# Patient Record
Sex: MALE | Race: WHITE | NOT HISPANIC OR LATINO | URBAN - METROPOLITAN AREA
[De-identification: names, ages, dates, MRNs, and addresses within clinical notes are randomized per-mention and may not be internally consistent; named-entity substitution may affect disease eponyms.]

---

## 2017-01-31 ENCOUNTER — EMERGENCY (EMERGENCY)
Facility: HOSPITAL | Age: 82
LOS: 1 days | Discharge: HOME | End: 2017-01-31
Attending: EMERGENCY MEDICINE | Admitting: EMERGENCY MEDICINE
Payer: MEDICARE

## 2017-01-31 VITALS
WEIGHT: 134.92 LBS | HEART RATE: 76 BPM | OXYGEN SATURATION: 99 % | HEIGHT: 64 IN | DIASTOLIC BLOOD PRESSURE: 86 MMHG | RESPIRATION RATE: 18 BRPM | SYSTOLIC BLOOD PRESSURE: 146 MMHG | TEMPERATURE: 98 F

## 2017-01-31 DIAGNOSIS — Z98.890 OTHER SPECIFIED POSTPROCEDURAL STATES: ICD-10-CM

## 2017-01-31 DIAGNOSIS — Z88.8 ALLERGY STATUS TO OTHER DRUGS, MEDICAMENTS AND BIOLOGICAL SUBSTANCES STATUS: ICD-10-CM

## 2017-01-31 DIAGNOSIS — M67.432 GANGLION, LEFT WRIST: ICD-10-CM

## 2017-01-31 DIAGNOSIS — M79.642 PAIN IN LEFT HAND: ICD-10-CM

## 2017-01-31 DIAGNOSIS — Z95.0 PRESENCE OF CARDIAC PACEMAKER: Chronic | ICD-10-CM

## 2017-01-31 DIAGNOSIS — Z82.49 FAMILY HISTORY OF ISCHEMIC HEART DISEASE AND OTHER DISEASES OF THE CIRCULATORY SYSTEM: Chronic | ICD-10-CM

## 2017-01-31 DIAGNOSIS — Z98.890 OTHER SPECIFIED POSTPROCEDURAL STATES: Chronic | ICD-10-CM

## 2017-01-31 DIAGNOSIS — I25.10 ATHEROSCLEROTIC HEART DISEASE OF NATIVE CORONARY ARTERY WITHOUT ANGINA PECTORIS: ICD-10-CM

## 2017-01-31 PROCEDURE — 73130 X-RAY EXAM OF HAND: CPT | Mod: 26,LT

## 2017-01-31 PROCEDURE — 99283 EMERGENCY DEPT VISIT LOW MDM: CPT | Mod: 25

## 2017-01-31 NOTE — ED ADULT TRIAGE NOTE - CHIEF COMPLAINT QUOTE
Patient to Ed with complaint of left hand pain beginning 2 days PTA.  Patient states no trauma but bump has gotten larger.  No acute distress

## 2017-01-31 NOTE — ED PROVIDER NOTE - PSH
Family history of coronary artery bypass graft surgery    H/O prior ablation treatment    History of pacemaker

## 2017-01-31 NOTE — ED PROVIDER NOTE - MUSCULOSKELETAL, MLM
Spine appears normal, range of motion is not limited, + small focal swelling over carpal bones at dorsal L hand.

## 2017-01-31 NOTE — ED PROVIDER NOTE - OBJECTIVE STATEMENT
Pt here with swelling to dorsal aspect of the L hand a this week- atraumatic. + slightly painful. Worse with mvt. Not taking pain meds for it.

## 2017-01-31 NOTE — ED PROVIDER NOTE - MEDICAL DECISION MAKING DETAILS
hand pain and swelling most consistent with small ganglion cyst. Xrays wnl. will d/c with rec for hand fu. OTC NSAIDS and APAP for pain.

## 2017-07-17 ENCOUNTER — EMERGENCY (EMERGENCY)
Facility: HOSPITAL | Age: 82
LOS: 1 days | Discharge: HOME | End: 2017-07-17
Admitting: EMERGENCY MEDICINE
Payer: MEDICARE

## 2017-07-17 VITALS
OXYGEN SATURATION: 98 % | RESPIRATION RATE: 16 BRPM | TEMPERATURE: 99 F | DIASTOLIC BLOOD PRESSURE: 77 MMHG | SYSTOLIC BLOOD PRESSURE: 145 MMHG | HEART RATE: 76 BPM

## 2017-07-17 DIAGNOSIS — S30.861A INSECT BITE (NONVENOMOUS) OF ABDOMINAL WALL, INITIAL ENCOUNTER: ICD-10-CM

## 2017-07-17 DIAGNOSIS — Z98.890 OTHER SPECIFIED POSTPROCEDURAL STATES: Chronic | ICD-10-CM

## 2017-07-17 DIAGNOSIS — I25.10 ATHEROSCLEROTIC HEART DISEASE OF NATIVE CORONARY ARTERY WITHOUT ANGINA PECTORIS: ICD-10-CM

## 2017-07-17 DIAGNOSIS — Z95.0 PRESENCE OF CARDIAC PACEMAKER: Chronic | ICD-10-CM

## 2017-07-17 DIAGNOSIS — Z88.8 ALLERGY STATUS TO OTHER DRUGS, MEDICAMENTS AND BIOLOGICAL SUBSTANCES STATUS: ICD-10-CM

## 2017-07-17 DIAGNOSIS — Z82.49 FAMILY HISTORY OF ISCHEMIC HEART DISEASE AND OTHER DISEASES OF THE CIRCULATORY SYSTEM: Chronic | ICD-10-CM

## 2017-07-17 PROCEDURE — 99282 EMERGENCY DEPT VISIT SF MDM: CPT

## 2017-07-17 NOTE — ED PROVIDER NOTE - CARE PLAN
Principal Discharge DX:	Insect bite, initial encounter  Instructions for follow-up, activity and diet:	Please use hydrocortisone cream on area. Ice pack. Try not to scratch.   You may take antihistamine like claritin during the day for itching. Be cautious with benadryl as night as this could make you dizzy. follow up for any signs of infection

## 2017-07-17 NOTE — ED PROVIDER NOTE - MEDICAL DECISION MAKING DETAILS
83 year old male with cardiac h/o presents with bug bites to navel area. no chest pain, VSS. no infection. no abscess. will treat symptoms. follow up for worsening symptoms

## 2017-07-17 NOTE — ED PROVIDER NOTE - PMH
Coronary artery disease involving native heart, angina presence unspecified, unspecified vessel or lesion type    Pacemaker

## 2017-07-17 NOTE — ED PROVIDER NOTE - PLAN OF CARE
Please use hydrocortisone cream on area. Ice pack. Try not to scratch.   You may take antihistamine like claritin during the day for itching. Be cautious with benadryl as night as this could make you dizzy. follow up for any signs of infection

## 2017-07-17 NOTE — ED PROVIDER NOTE - SKIN, MLM
several insect bites surrounding navel, no sign of infection, mild erythema, no warmth, no drainage, no abscess

## 2017-07-17 NOTE — ED PROVIDER NOTE - OBJECTIVE STATEMENT
83 year old male presents with h/o CAD, triple bypass (2015), pacemaker placement with complaints of itchy bug bites around his navel. reports noticed this morning. reports putting lotion on it and witch hazel which helped some. comes today to ensure nothing more serious is going on.   denies fever, chills, N/V/D, chest pain, dyspnea, abdominal pain.

## 2017-07-17 NOTE — ED PROVIDER NOTE - SKIN [+], MLM
insect bites to area around navel, mild erythema, no warmth, no streaking, no fluctuance, no abscess

## 2017-09-29 ENCOUNTER — EMERGENCY (EMERGENCY)
Facility: HOSPITAL | Age: 82
LOS: 1 days | Discharge: HOME | End: 2017-09-29
Attending: EMERGENCY MEDICINE | Admitting: EMERGENCY MEDICINE
Payer: MEDICARE

## 2017-09-29 VITALS
RESPIRATION RATE: 18 BRPM | SYSTOLIC BLOOD PRESSURE: 134 MMHG | HEART RATE: 91 BPM | DIASTOLIC BLOOD PRESSURE: 79 MMHG | TEMPERATURE: 98 F | OXYGEN SATURATION: 99 %

## 2017-09-29 DIAGNOSIS — R42 DIZZINESS AND GIDDINESS: ICD-10-CM

## 2017-09-29 DIAGNOSIS — Z82.49 FAMILY HISTORY OF ISCHEMIC HEART DISEASE AND OTHER DISEASES OF THE CIRCULATORY SYSTEM: Chronic | ICD-10-CM

## 2017-09-29 DIAGNOSIS — Z95.0 PRESENCE OF CARDIAC PACEMAKER: Chronic | ICD-10-CM

## 2017-09-29 DIAGNOSIS — Z98.890 OTHER SPECIFIED POSTPROCEDURAL STATES: Chronic | ICD-10-CM

## 2017-09-29 DIAGNOSIS — Z88.2 ALLERGY STATUS TO SULFONAMIDES: ICD-10-CM

## 2017-09-29 DIAGNOSIS — R55 SYNCOPE AND COLLAPSE: ICD-10-CM

## 2017-09-29 LAB
ALBUMIN SERPL ELPH-MCNC: 4 G/DL — SIGNIFICANT CHANGE UP (ref 3.4–5)
ALP SERPL-CCNC: 73 U/L — SIGNIFICANT CHANGE UP (ref 40–120)
ALT FLD-CCNC: 18 U/L — SIGNIFICANT CHANGE UP (ref 12–42)
ANION GAP SERPL CALC-SCNC: 5 MMOL/L — LOW (ref 9–16)
APTT BLD: 46.3 SEC — HIGH (ref 27.5–36.5)
AST SERPL-CCNC: 25 U/L — SIGNIFICANT CHANGE UP (ref 15–37)
BASOPHILS NFR BLD AUTO: 0.8 % — SIGNIFICANT CHANGE UP (ref 0–2)
BILIRUB SERPL-MCNC: 1.3 MG/DL — HIGH (ref 0.2–1.2)
BUN SERPL-MCNC: 21 MG/DL — SIGNIFICANT CHANGE UP (ref 7–23)
CALCIUM SERPL-MCNC: 8.9 MG/DL — SIGNIFICANT CHANGE UP (ref 8.5–10.5)
CHLORIDE SERPL-SCNC: 100 MMOL/L — SIGNIFICANT CHANGE UP (ref 96–108)
CK SERPL-CCNC: 172 U/L — SIGNIFICANT CHANGE UP (ref 39–308)
CO2 SERPL-SCNC: 29 MMOL/L — SIGNIFICANT CHANGE UP (ref 22–31)
CREAT SERPL-MCNC: 1.32 MG/DL — HIGH (ref 0.5–1.3)
EOSINOPHIL NFR BLD AUTO: 0.5 % — SIGNIFICANT CHANGE UP (ref 0–6)
GLUCOSE SERPL-MCNC: 106 MG/DL — HIGH (ref 70–99)
HCT VFR BLD CALC: 41.6 % — SIGNIFICANT CHANGE UP (ref 39–50)
HGB BLD-MCNC: 12.7 G/DL — LOW (ref 13–17)
IMM GRANULOCYTES NFR BLD AUTO: 0.2 % — SIGNIFICANT CHANGE UP (ref 0–1.5)
INR BLD: 1.62 — HIGH (ref 0.88–1.16)
LYMPHOCYTES # BLD AUTO: 16.9 % — SIGNIFICANT CHANGE UP (ref 13–44)
MCHC RBC-ENTMCNC: 23.4 PG — LOW (ref 27–34)
MCHC RBC-ENTMCNC: 30.5 G/DL — LOW (ref 32–36)
MCV RBC AUTO: 76.6 FL — LOW (ref 80–100)
MONOCYTES NFR BLD AUTO: 9.4 % — SIGNIFICANT CHANGE UP (ref 2–14)
NEUTROPHILS NFR BLD AUTO: 72.2 % — SIGNIFICANT CHANGE UP (ref 43–77)
PLATELET # BLD AUTO: 117 K/UL — LOW (ref 150–400)
POTASSIUM SERPL-MCNC: 4.1 MMOL/L — SIGNIFICANT CHANGE UP (ref 3.5–5.3)
POTASSIUM SERPL-SCNC: 4.1 MMOL/L — SIGNIFICANT CHANGE UP (ref 3.5–5.3)
PROT SERPL-MCNC: 7.7 G/DL — SIGNIFICANT CHANGE UP (ref 6.4–8.2)
PROTHROM AB SERPL-ACNC: 18 SEC — HIGH (ref 9.8–12.7)
RBC # BLD: 5.43 M/UL — SIGNIFICANT CHANGE UP (ref 4.2–5.8)
RBC # FLD: 18.9 % — HIGH (ref 10.3–16.9)
SODIUM SERPL-SCNC: 134 MMOL/L — SIGNIFICANT CHANGE UP (ref 132–145)
TROPONIN I SERPL-MCNC: <0.017 NG/ML — LOW (ref 0.02–0.06)
WBC # BLD: 6.1 K/UL — SIGNIFICANT CHANGE UP (ref 3.8–10.5)
WBC # FLD AUTO: 6.1 K/UL — SIGNIFICANT CHANGE UP (ref 3.8–10.5)

## 2017-09-29 PROCEDURE — 93010 ELECTROCARDIOGRAM REPORT: CPT

## 2017-09-29 PROCEDURE — 93010 ELECTROCARDIOGRAM REPORT: CPT | Mod: 77

## 2017-09-29 PROCEDURE — 71020: CPT | Mod: 26

## 2017-09-29 PROCEDURE — 71010: CPT | Mod: 26

## 2017-09-29 PROCEDURE — 99285 EMERGENCY DEPT VISIT HI MDM: CPT | Mod: 25

## 2017-09-29 RX ORDER — SODIUM CHLORIDE 9 MG/ML
3 INJECTION INTRAMUSCULAR; INTRAVENOUS; SUBCUTANEOUS ONCE
Qty: 0 | Refills: 0 | Status: COMPLETED | OUTPATIENT
Start: 2017-09-29 | End: 2017-09-29

## 2017-09-29 RX ORDER — SODIUM CHLORIDE 9 MG/ML
1000 INJECTION INTRAMUSCULAR; INTRAVENOUS; SUBCUTANEOUS
Qty: 0 | Refills: 0 | Status: DISCONTINUED | OUTPATIENT
Start: 2017-09-29 | End: 2017-10-03

## 2017-09-29 RX ADMIN — SODIUM CHLORIDE 3 MILLILITER(S): 9 INJECTION INTRAMUSCULAR; INTRAVENOUS; SUBCUTANEOUS at 12:36

## 2017-09-29 RX ADMIN — SODIUM CHLORIDE 200 MILLILITER(S): 9 INJECTION INTRAMUSCULAR; INTRAVENOUS; SUBCUTANEOUS at 12:35

## 2017-09-29 NOTE — ED PROVIDER NOTE - DIAGNOSTIC INTERPRETATION
Interpreted by ED physician  Chest x-ray, 2 views  Lungs clear, heart shadow normal, bony structures normal, no free air under diaphragm, no PTX

## 2017-09-29 NOTE — ED PROVIDER NOTE - PROGRESS NOTE DETAILS
No events on the monitor, labs unremarkable, negative trop, mild Cr elevation - could be slightly dehydrated.  received almost 1 L NS.  Remains asymptomatic since his arrival.  Spoke with cardiologist Dr. Hdez.  Recent negative provacative testing.  Likely not cardiac in origin.  EKG x 2 paced and non ischemic.  Conservative management discussed with the patient in detail.  Close PMD follow up encouraged.  Strict ED return instructions discussed in detail and patient given the opportunity to ask any questions about their discharge diagnosis and instructions

## 2017-09-29 NOTE — ED ADULT NURSE NOTE - OBJECTIVE STATEMENT
Pt states he got up to go to the bathroom and felt dizzy and passed out. Pt at this time has no chest pain, denies shortness of breath, no n/v. Pt appears calm and in no distress. hx triple bipass

## 2017-09-29 NOTE — ED PROVIDER NOTE - OBJECTIVE STATEMENT
84 yo M with Hx of CAD s/p CABG, pacemaker, and ablation for Atrial flutter, who follows with cardiologist Dr. Jairo Hdez, presents c/o possible syncope episode. Pt states 2 hours PTA fell on the floor after feeling dizzy with positional head movements. Pt denies trip and fall, HA, N/V, CP, or SOB. Pt is compliant with medications. No toxic habits or male enhancement medication use.

## 2017-09-29 NOTE — ED PROVIDER NOTE - HEME/LYMPH NEGATIVE STATEMENT, MLM
no anemia, no easy bruising, no jaundice, no swollen lymph nodes. no jaundice, no swollen lymph nodes.

## 2017-09-29 NOTE — ED PROVIDER NOTE - MEDICAL DECISION MAKING DETAILS
82 yo M with Hx of CABG, pacemaker, and ablation presents s/p likely syncope. No symptoms prior to episode. EKG paced without ischemic changes. No signs of head, neck, or back trauma and no vital sign derangements. Ordered labs, fluids, monitor, and will contact cardiologist. 82 yo M with Hx of CABG, pacemaker, and ablation presents s/p falling on the group vs syncope. No symptoms prior to episode. EKG paced without ischemic changes. No signs of head, neck, or back trauma and no vital sign derangements. Asymptomatic on arrival.  Very well appearing.  Ordered labs, fluids, monitor, and will contact cardiologist.

## 2017-09-29 NOTE — ED PROVIDER NOTE - GASTROINTESTINAL NEGATIVE STATEMENT, MLM
no abdominal pain, no bloating, no constipation, no diarrhea, no nausea and no vomiting. no abdominal pain,  no diarrhea, no nausea and no vomiting.

## 2018-04-01 ENCOUNTER — EMERGENCY (EMERGENCY)
Facility: HOSPITAL | Age: 83
LOS: 1 days | Discharge: HOME | End: 2018-04-01
Attending: EMERGENCY MEDICINE | Admitting: EMERGENCY MEDICINE
Payer: MEDICARE

## 2018-04-01 VITALS
SYSTOLIC BLOOD PRESSURE: 137 MMHG | TEMPERATURE: 98 F | HEART RATE: 86 BPM | OXYGEN SATURATION: 100 % | RESPIRATION RATE: 18 BRPM | DIASTOLIC BLOOD PRESSURE: 74 MMHG

## 2018-04-01 DIAGNOSIS — Z98.890 OTHER SPECIFIED POSTPROCEDURAL STATES: Chronic | ICD-10-CM

## 2018-04-01 DIAGNOSIS — Z82.49 FAMILY HISTORY OF ISCHEMIC HEART DISEASE AND OTHER DISEASES OF THE CIRCULATORY SYSTEM: Chronic | ICD-10-CM

## 2018-04-01 DIAGNOSIS — Z95.0 PRESENCE OF CARDIAC PACEMAKER: Chronic | ICD-10-CM

## 2018-04-01 PROCEDURE — 70450 CT HEAD/BRAIN W/O DYE: CPT | Mod: 26

## 2018-04-01 PROCEDURE — 72125 CT NECK SPINE W/O DYE: CPT | Mod: 26

## 2018-04-01 PROCEDURE — 99284 EMERGENCY DEPT VISIT MOD MDM: CPT

## 2018-04-01 RX ORDER — ACETAMINOPHEN 500 MG
650 TABLET ORAL ONCE
Qty: 0 | Refills: 0 | Status: COMPLETED | OUTPATIENT
Start: 2018-04-01 | End: 2018-04-01

## 2018-04-01 RX ORDER — CYCLOBENZAPRINE HYDROCHLORIDE 10 MG/1
1 TABLET, FILM COATED ORAL
Qty: 20 | Refills: 0
Start: 2018-04-01

## 2018-04-01 RX ORDER — CYCLOBENZAPRINE HYDROCHLORIDE 10 MG/1
10 TABLET, FILM COATED ORAL ONCE
Qty: 0 | Refills: 0 | Status: COMPLETED | OUTPATIENT
Start: 2018-04-01 | End: 2018-04-01

## 2018-04-01 RX ADMIN — CYCLOBENZAPRINE HYDROCHLORIDE 10 MILLIGRAM(S): 10 TABLET, FILM COATED ORAL at 08:46

## 2018-04-01 RX ADMIN — Medication 650 MILLIGRAM(S): at 08:46

## 2018-04-01 NOTE — ED ADULT TRIAGE NOTE - CHIEF COMPLAINT QUOTE
pt presents with sudden onset right neck pain, which he woke with two days ago. full ROM, states that pain is sharp.

## 2018-04-01 NOTE — ED PROVIDER NOTE - PROGRESS NOTE DETAILS
Pain improved after Flexeril and Tylenol.  Pt requests Rx for Flexeril - discussed potential SEs. Discussed CT results in detail.  Pt has f/u with his GP this coming week.  Given results and told to take to his GP appointment for re-evaluation.  Emergent return precautions discussed.

## 2018-04-01 NOTE — ED PROVIDER NOTE - PHYSICAL EXAMINATION
VITAL SIGNS: I have reviewed nursing notes and confirm.  CONSTITUTIONAL: Well-developed; well-nourished; in no acute distress.  SKIN: Skin is warm and dry, no acute rash or lesions.   HEAD: Normocephalic; atraumatic.  EYES: PERRL, EOM intact; conjunctiva and sclera clear.  ENT: No nasal discharge; airway clear.  NECK: Supple; +Point TTP to R upper posterior paraspinal area, just lateral to C-2.  Normal Carotid pulses (+2), no bruit.    CARD: S1, S2 normal; no murmurs, gallops, or rubs. Regular rate and rhythm.  RESP: No wheezes, rales or rhonchi.  ABD: Normal bowel sounds; soft; non-distended; non-tender; no hepatosplenomegaly.  EXT: Normal ROM. No clubbing, cyanosis or edema.  NEURO: Patient is alert, oriented x person, place and time.  Cranial nerves 2-12 are intact.  Normal gait and speech.  Cerebellar testing normal:  negative Romberg, normal coordination and normal finger to nose, heal to shin and rapid alternating movements.  Normal proprioception and sensory exam.  No pronator drift.  5/5 bl upper extremity and lower extremity strength.  PSYCH: Cooperative, appropriate.

## 2018-04-01 NOTE — ED PROVIDER NOTE - OBJECTIVE STATEMENT
Pt is an 84yo M with a h/o CAD (s/p bypass, ablation, and PM - on Eliquis), HL, and hypothyroid who p/w R upper/posterior neck pain.  Pt awoke with pain yesterday.  Pain is very localized and pinpoints spot of pain to R upper posterior neck, ~c2 paraspinal area.  Denies any radiation of pain.  Pain is sharp and reports it feels like a "pinched nerve."  Denies any trauma but does reports recently going back to the gym but denies any excessive lifting and no heavy lifting.  Denies any specific injury.  Denies any HA, numbness, weakness, vision loss, or other concerns.

## 2018-04-01 NOTE — ED PROVIDER NOTE - MEDICAL DECISION MAKING DETAILS
Neck pain - clinically likely MSK strain vs pinched cervical nerve vs compression fracture.  Will give tylenol and muscle relaxant.  Will perform C-spine and CT head to r/o any fractures.

## 2018-04-05 DIAGNOSIS — Z79.02 LONG TERM (CURRENT) USE OF ANTITHROMBOTICS/ANTIPLATELETS: ICD-10-CM

## 2018-04-05 DIAGNOSIS — Z88.8 ALLERGY STATUS TO OTHER DRUGS, MEDICAMENTS AND BIOLOGICAL SUBSTANCES: ICD-10-CM

## 2018-04-05 DIAGNOSIS — M54.2 CERVICALGIA: ICD-10-CM

## 2018-04-05 DIAGNOSIS — I25.10 ATHEROSCLEROTIC HEART DISEASE OF NATIVE CORONARY ARTERY WITHOUT ANGINA PECTORIS: ICD-10-CM

## 2018-04-05 DIAGNOSIS — E03.9 HYPOTHYROIDISM, UNSPECIFIED: ICD-10-CM

## 2018-04-21 ENCOUNTER — EMERGENCY (EMERGENCY)
Facility: HOSPITAL | Age: 83
LOS: 1 days | Discharge: HOME | End: 2018-04-21
Attending: EMERGENCY MEDICINE | Admitting: EMERGENCY MEDICINE
Payer: MEDICARE

## 2018-04-21 VITALS
OXYGEN SATURATION: 97 % | TEMPERATURE: 98 F | DIASTOLIC BLOOD PRESSURE: 67 MMHG | WEIGHT: 143.08 LBS | HEART RATE: 70 BPM | HEIGHT: 64 IN | RESPIRATION RATE: 18 BRPM | SYSTOLIC BLOOD PRESSURE: 122 MMHG

## 2018-04-21 DIAGNOSIS — Z95.0 PRESENCE OF CARDIAC PACEMAKER: Chronic | ICD-10-CM

## 2018-04-21 DIAGNOSIS — Z98.890 OTHER SPECIFIED POSTPROCEDURAL STATES: Chronic | ICD-10-CM

## 2018-04-21 DIAGNOSIS — S50.12XA CONTUSION OF LEFT FOREARM, INITIAL ENCOUNTER: ICD-10-CM
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
## 2018-04-21 DIAGNOSIS — I10 ESSENTIAL (PRIMARY) HYPERTENSION: ICD-10-CM

## 2018-04-21 DIAGNOSIS — I25.10 ATHEROSCLEROTIC HEART DISEASE OF NATIVE CORONARY ARTERY WITHOUT ANGINA PECTORIS: ICD-10-CM
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
## 2018-04-21 DIAGNOSIS — E03.9 HYPOTHYROIDISM, UNSPECIFIED: ICD-10-CM

## 2018-04-21 DIAGNOSIS — Z79.899 OTHER LONG TERM (CURRENT) DRUG THERAPY: ICD-10-CM
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
## 2018-04-21 DIAGNOSIS — E78.5 HYPERLIPIDEMIA, UNSPECIFIED: ICD-10-CM

## 2018-04-21 DIAGNOSIS — Z82.49 FAMILY HISTORY OF ISCHEMIC HEART DISEASE AND OTHER DISEASES OF THE CIRCULATORY SYSTEM: Chronic | ICD-10-CM

## 2018-04-21 DIAGNOSIS — Z88.8 ALLERGY STATUS TO OTHER DRUGS, MEDICAMENTS AND BIOLOGICAL SUBSTANCES STATUS: ICD-10-CM

## 2018-04-21 PROCEDURE — 99282 EMERGENCY DEPT VISIT SF MDM: CPT

## 2018-04-21 NOTE — ED ADULT NURSE NOTE - OBJECTIVE STATEMENT
pt w/ hx CAD, htn, hld c/o left arm discoloration/bruising s/p fall 2 weeks ago. Denies pain or swelling but states he is concerned because he is on eliquis. pt w/ hx CAD, htn, hld c/o left arm discoloration/bruising s/p fall 2 weeks ago. Denies pain or swelling but states he is concerned because he is on eliquis. Denies hitting head or LOC.

## 2018-04-21 NOTE — ED PROVIDER NOTE - SKIN, MLM
Healing ecchymosis to L forearm, non-tender, non-swollen, no increase warmth, with variation of color from blue to green and yellow.

## 2018-04-21 NOTE — ED ADULT TRIAGE NOTE - CHIEF COMPLAINT QUOTE
pt c/o discoloration bruising to left elbow/left arm s/p fall 2 weeks ago. Denies pain, swelling or discomfort. Is concerned because he is on blood thinners.

## 2018-04-25 ENCOUNTER — EMERGENCY (EMERGENCY)
Facility: HOSPITAL | Age: 83
LOS: 1 days | Discharge: HOME | End: 2018-04-25
Admitting: EMERGENCY MEDICINE
Payer: MEDICARE

## 2018-04-25 VITALS
OXYGEN SATURATION: 99 % | HEART RATE: 71 BPM | SYSTOLIC BLOOD PRESSURE: 132 MMHG | DIASTOLIC BLOOD PRESSURE: 71 MMHG | TEMPERATURE: 98 F | RESPIRATION RATE: 17 BRPM

## 2018-04-25 VITALS
OXYGEN SATURATION: 98 % | WEIGHT: 134.92 LBS | SYSTOLIC BLOOD PRESSURE: 139 MMHG | DIASTOLIC BLOOD PRESSURE: 75 MMHG | HEART RATE: 73 BPM | RESPIRATION RATE: 20 BRPM | TEMPERATURE: 98 F

## 2018-04-25 DIAGNOSIS — Z98.890 OTHER SPECIFIED POSTPROCEDURAL STATES: Chronic | ICD-10-CM

## 2018-04-25 DIAGNOSIS — Z95.0 PRESENCE OF CARDIAC PACEMAKER: Chronic | ICD-10-CM

## 2018-04-25 DIAGNOSIS — Z82.49 FAMILY HISTORY OF ISCHEMIC HEART DISEASE AND OTHER DISEASES OF THE CIRCULATORY SYSTEM: Chronic | ICD-10-CM

## 2018-04-25 LAB
ALBUMIN SERPL ELPH-MCNC: 3.5 G/DL — SIGNIFICANT CHANGE UP (ref 3.4–5)
ALP SERPL-CCNC: 143 U/L — HIGH (ref 40–120)
ALT FLD-CCNC: 52 U/L — HIGH (ref 12–42)
ANION GAP SERPL CALC-SCNC: 7 MMOL/L — LOW (ref 9–16)
AST SERPL-CCNC: 51 U/L — HIGH (ref 15–37)
BILIRUB SERPL-MCNC: 1.2 MG/DL — SIGNIFICANT CHANGE UP (ref 0.2–1.2)
BUN SERPL-MCNC: 23 MG/DL — SIGNIFICANT CHANGE UP (ref 7–23)
CALCIUM SERPL-MCNC: 9.2 MG/DL — SIGNIFICANT CHANGE UP (ref 8.5–10.5)
CHLORIDE SERPL-SCNC: 103 MMOL/L — SIGNIFICANT CHANGE UP (ref 96–108)
CO2 SERPL-SCNC: 29 MMOL/L — SIGNIFICANT CHANGE UP (ref 22–31)
CREAT SERPL-MCNC: 1.39 MG/DL — HIGH (ref 0.5–1.3)
GLUCOSE SERPL-MCNC: 101 MG/DL — HIGH (ref 70–99)
HCT VFR BLD CALC: 43.5 % — SIGNIFICANT CHANGE UP (ref 39–50)
HGB BLD-MCNC: 13.8 G/DL — SIGNIFICANT CHANGE UP (ref 13–17)
INR BLD: 1.59 — HIGH (ref 0.88–1.16)
MCHC RBC-ENTMCNC: 29.1 PG — SIGNIFICANT CHANGE UP (ref 27–34)
MCHC RBC-ENTMCNC: 31.7 G/DL — LOW (ref 32–36)
MCV RBC AUTO: 91.6 FL — SIGNIFICANT CHANGE UP (ref 80–100)
PLATELET # BLD AUTO: 172 K/UL — SIGNIFICANT CHANGE UP (ref 150–400)
POTASSIUM SERPL-MCNC: 4.6 MMOL/L — SIGNIFICANT CHANGE UP (ref 3.5–5.3)
POTASSIUM SERPL-SCNC: 4.6 MMOL/L — SIGNIFICANT CHANGE UP (ref 3.5–5.3)
PROT SERPL-MCNC: 8 G/DL — SIGNIFICANT CHANGE UP (ref 6.4–8.2)
PROTHROM AB SERPL-ACNC: 17.6 SEC — HIGH (ref 9.8–12.7)
RBC # BLD: 4.75 M/UL — SIGNIFICANT CHANGE UP (ref 4.2–5.8)
RBC # FLD: 16.2 % — SIGNIFICANT CHANGE UP (ref 10.3–16.9)
SODIUM SERPL-SCNC: 139 MMOL/L — SIGNIFICANT CHANGE UP (ref 132–145)
WBC # BLD: 6.9 K/UL — SIGNIFICANT CHANGE UP (ref 3.8–10.5)
WBC # FLD AUTO: 6.9 K/UL — SIGNIFICANT CHANGE UP (ref 3.8–10.5)

## 2018-04-25 PROCEDURE — 93971 EXTREMITY STUDY: CPT | Mod: 26,RT

## 2018-04-25 PROCEDURE — 99284 EMERGENCY DEPT VISIT MOD MDM: CPT

## 2018-04-25 NOTE — ED PROVIDER NOTE - OBJECTIVE STATEMENT
84 y/o male with PMHx of CAD (has a pacemaker, CABGx4, currently on Eliquis) presents to ED from his PCP's office for evaluation of right lower leg swelling. Patient's PCP Dr. Dudley requests patient have US doppler to r/o DVT. Patient states he has had worsening pain to the right calf for 4 days and has noted swelling to the area. He has been taking Eliquis as prescribed. Denies trauma, fever, chills, nausea, vomiting, CP, SOB, or any long plane or train rides. He did return from a cruise within the past month, which included a flight to [place]. Patient also notes he has a large ecchymosis to the left lower arm, atraumatic. He was seen here 4 days ago for this.

## 2018-04-25 NOTE — ED PROVIDER NOTE - MEDICAL DECISION MAKING DETAILS
82 y/o M presents to ED c/o R calf swelling.  Pt noted to have ecchymosis.  Labs, coags and platelets nl.  Pt well appearing.  us done.  Pt discussed with pts PCP, Dr. Dudley who requests pt be discharged and PCP be called directly with result as pt is not willing to stay for result.

## 2018-04-25 NOTE — ED PROVIDER NOTE - SKIN, MLM
Skin very tan. Ecchymosis noted to posterior right lower leg with minimal tenderness and no surrounding erythema. Skin intact. Bilateral legs of equal warmth and sensation. Subtle swelling to lateral lower leg. Resolving ecchymosis to left lower arm.

## 2018-04-29 DIAGNOSIS — I25.10 ATHEROSCLEROTIC HEART DISEASE OF NATIVE CORONARY ARTERY WITHOUT ANGINA PECTORIS: ICD-10-CM

## 2018-04-29 DIAGNOSIS — M79.89 OTHER SPECIFIED SOFT TISSUE DISORDERS: ICD-10-CM

## 2018-04-29 DIAGNOSIS — Z79.2 LONG TERM (CURRENT) USE OF ANTIBIOTICS: ICD-10-CM

## 2018-04-29 DIAGNOSIS — Z88.8 ALLERGY STATUS TO OTHER DRUGS, MEDICAMENTS AND BIOLOGICAL SUBSTANCES STATUS: ICD-10-CM

## 2018-04-29 DIAGNOSIS — M79.661 PAIN IN RIGHT LOWER LEG: ICD-10-CM

## 2018-06-17 ENCOUNTER — EMERGENCY (EMERGENCY)
Facility: HOSPITAL | Age: 83
LOS: 1 days | Discharge: HOME | End: 2018-06-17
Admitting: EMERGENCY MEDICINE
Payer: MEDICARE

## 2018-06-17 VITALS
DIASTOLIC BLOOD PRESSURE: 67 MMHG | RESPIRATION RATE: 18 BRPM | HEART RATE: 74 BPM | OXYGEN SATURATION: 99 % | TEMPERATURE: 98 F | SYSTOLIC BLOOD PRESSURE: 112 MMHG

## 2018-06-17 DIAGNOSIS — Z98.890 OTHER SPECIFIED POSTPROCEDURAL STATES: Chronic | ICD-10-CM

## 2018-06-17 DIAGNOSIS — Z82.49 FAMILY HISTORY OF ISCHEMIC HEART DISEASE AND OTHER DISEASES OF THE CIRCULATORY SYSTEM: Chronic | ICD-10-CM

## 2018-06-17 DIAGNOSIS — Z95.0 PRESENCE OF CARDIAC PACEMAKER: Chronic | ICD-10-CM

## 2018-06-17 PROCEDURE — 73564 X-RAY EXAM KNEE 4 OR MORE: CPT | Mod: 26,LT

## 2018-06-17 PROCEDURE — 73130 X-RAY EXAM OF HAND: CPT | Mod: 26,LT

## 2018-06-17 PROCEDURE — 99283 EMERGENCY DEPT VISIT LOW MDM: CPT

## 2018-06-17 RX ORDER — BACITRACIN ZINC 500 UNIT/G
1 OINTMENT IN PACKET (EA) TOPICAL ONCE
Qty: 0 | Refills: 0 | Status: COMPLETED | OUTPATIENT
Start: 2018-06-17 | End: 2018-06-17

## 2018-06-17 RX ORDER — TETANUS TOXOID, REDUCED DIPHTHERIA TOXOID AND ACELLULAR PERTUSSIS VACCINE, ADSORBED 5; 2.5; 8; 8; 2.5 [IU]/.5ML; [IU]/.5ML; UG/.5ML; UG/.5ML; UG/.5ML
0.5 SUSPENSION INTRAMUSCULAR ONCE
Qty: 0 | Refills: 0 | Status: COMPLETED | OUTPATIENT
Start: 2018-06-17 | End: 2018-06-17

## 2018-06-17 RX ADMIN — TETANUS TOXOID, REDUCED DIPHTHERIA TOXOID AND ACELLULAR PERTUSSIS VACCINE, ADSORBED 0.5 MILLILITER(S): 5; 2.5; 8; 8; 2.5 SUSPENSION INTRAMUSCULAR at 10:23

## 2018-06-17 RX ADMIN — Medication 1 APPLICATION(S): at 10:23

## 2018-06-17 NOTE — ED ADULT NURSE REASSESSMENT NOTE - NURSING SKIN WOUND APPEAR #1
clean/dry/cleaned with ns, applied xenoform dressing and bacitracin covered with telfa gauze and kerlix.

## 2018-06-17 NOTE — ED PROVIDER NOTE - OBJECTIVE STATEMENT
83 yo male hx CABG on eliquis, HIV on DENNIS meds, here c/o trip and fall yesterday ~17 hours ago, c/o abrasion to left hand and left knee yesterday. No head trauma or LOC. ambulatory. No chest pain, no headache, no dyspnea, no syncope. No neck pain or back pain or abdominal pain. Unknown last tetanus.

## 2018-06-17 NOTE — ED PROVIDER NOTE - MEDICAL DECISION MAKING DETAILS
s/p trip and fall yesterday, +abrasions to left hand and left knee, no neuro/motor deficits, ambulatory, xrays prelim neg, otc tylenol prn pain, wound cleaned, bacitracin applied with bandage, wound care discussed, d/c home, f/u PMD

## 2018-06-17 NOTE — ED PROVIDER NOTE - MUSCULOSKELETAL, MLM
LUE: +abrasion to volar aspect of palm between 3rd and 4th digits, no active bleeding or fb. no bony tenderness. no sensory or motor deficits. good cap refill. no wrist tenderness or snuffbox tenderness. LLE +abrasion to patella, no active bleeding or fb, no laceration, good ROM joints, stable knee, ambulatory, soft compartments, no neuro/motor deficits.

## 2018-06-17 NOTE — ED ADULT TRIAGE NOTE - CHIEF COMPLAINT QUOTE
pt c/o left hand abrasion and left knee pain and swelling s/p foosh last night at home. on eliquis pt stated it took an hour of pressure to stop bleeding. unk tetanus
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
## 2018-06-21 DIAGNOSIS — Z23 ENCOUNTER FOR IMMUNIZATION: ICD-10-CM

## 2018-06-21 DIAGNOSIS — S60.512A ABRASION OF LEFT HAND, INITIAL ENCOUNTER: ICD-10-CM
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
## 2018-06-21 DIAGNOSIS — I25.10 ATHEROSCLEROTIC HEART DISEASE OF NATIVE CORONARY ARTERY WITHOUT ANGINA PECTORIS: ICD-10-CM
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
## 2018-06-21 DIAGNOSIS — Z88.8 ALLERGY STATUS TO OTHER DRUGS, MEDICAMENTS AND BIOLOGICAL SUBSTANCES: ICD-10-CM

## 2018-06-21 DIAGNOSIS — Z79.899 OTHER LONG TERM (CURRENT) DRUG THERAPY: ICD-10-CM

## 2018-06-21 DIAGNOSIS — S80.212A ABRASION, LEFT KNEE, INITIAL ENCOUNTER: ICD-10-CM
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
## 2018-06-24 ENCOUNTER — EMERGENCY (EMERGENCY)
Facility: HOSPITAL | Age: 83
LOS: 1 days | Discharge: HOME | End: 2018-06-24
Admitting: EMERGENCY MEDICINE
Payer: MEDICARE

## 2018-06-24 VITALS
RESPIRATION RATE: 18 BRPM | DIASTOLIC BLOOD PRESSURE: 88 MMHG | OXYGEN SATURATION: 99 % | SYSTOLIC BLOOD PRESSURE: 133 MMHG | TEMPERATURE: 98 F | HEART RATE: 79 BPM

## 2018-06-24 DIAGNOSIS — Z79.899 OTHER LONG TERM (CURRENT) DRUG THERAPY: ICD-10-CM

## 2018-06-24 DIAGNOSIS — M25.462 EFFUSION, LEFT KNEE: ICD-10-CM

## 2018-06-24 DIAGNOSIS — Z88.2 ALLERGY STATUS TO SULFONAMIDES: ICD-10-CM

## 2018-06-24 DIAGNOSIS — Z82.49 FAMILY HISTORY OF ISCHEMIC HEART DISEASE AND OTHER DISEASES OF THE CIRCULATORY SYSTEM: Chronic | ICD-10-CM

## 2018-06-24 DIAGNOSIS — Z95.0 PRESENCE OF CARDIAC PACEMAKER: Chronic | ICD-10-CM

## 2018-06-24 DIAGNOSIS — L03.116 CELLULITIS OF LEFT LOWER LIMB: ICD-10-CM

## 2018-06-24 DIAGNOSIS — Z98.890 OTHER SPECIFIED POSTPROCEDURAL STATES: Chronic | ICD-10-CM

## 2018-06-24 PROCEDURE — 99283 EMERGENCY DEPT VISIT LOW MDM: CPT

## 2018-06-24 RX ORDER — AZTREONAM 2 G
1 VIAL (EA) INJECTION
Qty: 20 | Refills: 0 | OUTPATIENT
Start: 2018-06-24 | End: 2018-07-03

## 2018-06-24 RX ORDER — AZTREONAM 2 G
1 VIAL (EA) INJECTION
Qty: 20 | Refills: 0
Start: 2018-06-24 | End: 2018-07-03

## 2018-06-24 RX ORDER — CEPHALEXIN 500 MG
1 CAPSULE ORAL
Qty: 20 | Refills: 0
Start: 2018-06-24 | End: 2018-07-03

## 2018-06-24 RX ORDER — CEPHALEXIN 500 MG
500 CAPSULE ORAL ONCE
Qty: 0 | Refills: 0 | Status: COMPLETED | OUTPATIENT
Start: 2018-06-24 | End: 2018-06-24

## 2018-06-24 RX ORDER — CEPHALEXIN 500 MG
1 CAPSULE ORAL
Qty: 20 | Refills: 0 | OUTPATIENT
Start: 2018-06-24 | End: 2018-07-03

## 2018-06-24 RX ADMIN — Medication 1 TABLET(S): at 18:19

## 2018-06-24 RX ADMIN — Medication 500 MILLIGRAM(S): at 18:19

## 2018-06-24 NOTE — ED PROVIDER NOTE - OBJECTIVE STATEMENT
83 yo male hx CABG on eliquis, HIV on DENNIS meds presents here today c/o redness and swelling to his left knee. Pt was treated at our ED for a mechanical fall 1 week ago. He states that he was at the parade today and was told to go to the ER by a [place] nurse in concern for a septic knee. Pt was given cyclobenzaprine last week. Denies numbness, weakness, tingling, fevers or chills.

## 2018-06-24 NOTE — ED PROVIDER NOTE - MUSCULOSKELETAL, MLM
Left knee erythema, healed abrasion, mildly warm to touch , no TTP, no fusion, full ROM, no streaking , good popliteal pulses.

## 2018-06-24 NOTE — ED ADULT TRIAGE NOTE - CHIEF COMPLAINT QUOTE
pt here for knee pain recently seen in this ED. completed po abx, area is red and warm, pt is afebrile

## 2018-06-24 NOTE — ED PROVIDER NOTE - ATTESTATION, MLM
8
I have reviewed and confirmed nurses' notes for patient's medications, allergies, medical history, and surgical history.

## 2018-06-24 NOTE — ED PROVIDER NOTE - MEDICAL DECISION MAKING DETAILS
85 y/o M well appearing, VS stable, suspect small cellulitis. No concern for septic knee, will start on PO antibiotics and discussed worsening symptoms and return precautions.

## 2018-09-29 ENCOUNTER — EMERGENCY (EMERGENCY)
Facility: HOSPITAL | Age: 83
LOS: 1 days | Discharge: HOME | End: 2018-09-29
Attending: EMERGENCY MEDICINE | Admitting: EMERGENCY MEDICINE
Payer: MEDICARE

## 2018-09-29 VITALS
TEMPERATURE: 98 F | OXYGEN SATURATION: 98 % | DIASTOLIC BLOOD PRESSURE: 67 MMHG | RESPIRATION RATE: 18 BRPM | HEART RATE: 80 BPM | SYSTOLIC BLOOD PRESSURE: 126 MMHG

## 2018-09-29 DIAGNOSIS — Z79.899 OTHER LONG TERM (CURRENT) DRUG THERAPY: ICD-10-CM

## 2018-09-29 DIAGNOSIS — Z98.890 OTHER SPECIFIED POSTPROCEDURAL STATES: Chronic | ICD-10-CM

## 2018-09-29 DIAGNOSIS — Z79.2 LONG TERM (CURRENT) USE OF ANTIBIOTICS: ICD-10-CM

## 2018-09-29 DIAGNOSIS — Z88.8 ALLERGY STATUS TO OTHER DRUGS, MEDICAMENTS AND BIOLOGICAL SUBSTANCES STATUS: ICD-10-CM

## 2018-09-29 DIAGNOSIS — Z95.0 PRESENCE OF CARDIAC PACEMAKER: Chronic | ICD-10-CM

## 2018-09-29 DIAGNOSIS — Z82.49 FAMILY HISTORY OF ISCHEMIC HEART DISEASE AND OTHER DISEASES OF THE CIRCULATORY SYSTEM: Chronic | ICD-10-CM

## 2018-09-29 DIAGNOSIS — M79.652 PAIN IN LEFT THIGH: ICD-10-CM

## 2018-09-29 DIAGNOSIS — I25.10 ATHEROSCLEROTIC HEART DISEASE OF NATIVE CORONARY ARTERY WITHOUT ANGINA PECTORIS: ICD-10-CM

## 2018-09-29 PROCEDURE — 99283 EMERGENCY DEPT VISIT LOW MDM: CPT

## 2018-09-29 RX ORDER — ACETAMINOPHEN 500 MG
650 TABLET ORAL ONCE
Qty: 0 | Refills: 0 | Status: COMPLETED | OUTPATIENT
Start: 2018-09-29 | End: 2018-09-29

## 2018-09-29 RX ORDER — CYCLOBENZAPRINE HYDROCHLORIDE 10 MG/1
1 TABLET, FILM COATED ORAL
Qty: 15 | Refills: 0 | OUTPATIENT
Start: 2018-09-29 | End: 2018-10-03

## 2018-09-29 RX ORDER — CYCLOBENZAPRINE HYDROCHLORIDE 10 MG/1
10 TABLET, FILM COATED ORAL ONCE
Qty: 0 | Refills: 0 | Status: COMPLETED | OUTPATIENT
Start: 2018-09-29 | End: 2018-09-29

## 2018-09-29 RX ADMIN — CYCLOBENZAPRINE HYDROCHLORIDE 10 MILLIGRAM(S): 10 TABLET, FILM COATED ORAL at 19:09

## 2018-09-29 RX ADMIN — Medication 650 MILLIGRAM(S): at 19:09

## 2018-09-29 NOTE — ED ADULT NURSE NOTE - OBJECTIVE STATEMENT
pt aox4. neurologically wnl. no sob or difficulty breathing noted. lung sounds clear bilaterally. skin appropriate for ethnicity, warm and dry. cap refill < 2 secs. pulses 2+ and regular. abd rounded soft and nontender. pt c/o L thigh pain [place] pain exacerbated by lifting left up. No bruising noted to L thigh.

## 2018-09-29 NOTE — ED PROVIDER NOTE - MUSCULOSKELETAL, MLM
Tenderness to the anterior L thigh. No hematoma noted. Tenderness to the anterior L thigh. No hematoma noted. skin intact, no ecchymosis, no erythema

## 2018-09-29 NOTE — ED PROVIDER NOTE - MEDICAL DECISION MAKING DETAILS
Patient very well appearing, with spasm like sensation to the left upper fall s/p a slip and fall, no hematoma noted. Patient reports relief of symptoms following administration of felxeril and acetmminophen. Discharge with above.

## 2018-09-29 NOTE — ED ADULT NURSE NOTE - NSIMPLEMENTINTERV_GEN_ALL_ED
Implemented All Fall Risk Interventions:  [place] to call system. Call bell, personal items and telephone within reach. Instruct patient to call for assistance. Room bathroom lighting operational. Non-slip footwear when patient is off stretcher. Physically safe environment: no spills, clutter or unnecessary equipment. Stretcher in lowest position, wheels locked, appropriate side rails in place. Provide visual cue, wrist band, yellow gown, etc. Monitor gait and stability. Monitor for mental status changes and reorient to person, place, and time. Review medications for side effects contributing to fall risk. Reinforce activity limits and safety measures with patient and family.

## 2018-09-30 RX ORDER — CYCLOBENZAPRINE HYDROCHLORIDE 10 MG/1
1 TABLET, FILM COATED ORAL
Qty: 15 | Refills: 0
Start: 2018-09-30 | End: 2018-10-04

## 2018-10-02 ENCOUNTER — EMERGENCY (EMERGENCY)
Facility: HOSPITAL | Age: 83
LOS: 1 days | Discharge: HOME | End: 2018-10-02
Admitting: EMERGENCY MEDICINE
Payer: MEDICARE

## 2018-10-02 VITALS
RESPIRATION RATE: 17 BRPM | SYSTOLIC BLOOD PRESSURE: 139 MMHG | OXYGEN SATURATION: 95 % | HEART RATE: 97 BPM | DIASTOLIC BLOOD PRESSURE: 82 MMHG | TEMPERATURE: 98 F

## 2018-10-02 VITALS
HEART RATE: 88 BPM | DIASTOLIC BLOOD PRESSURE: 63 MMHG | TEMPERATURE: 98 F | OXYGEN SATURATION: 99 % | SYSTOLIC BLOOD PRESSURE: 112 MMHG | RESPIRATION RATE: 17 BRPM

## 2018-10-02 DIAGNOSIS — Z79.899 OTHER LONG TERM (CURRENT) DRUG THERAPY: ICD-10-CM

## 2018-10-02 DIAGNOSIS — Z79.2 LONG TERM (CURRENT) USE OF ANTIBIOTICS: ICD-10-CM

## 2018-10-02 DIAGNOSIS — M79.652 PAIN IN LEFT THIGH: ICD-10-CM

## 2018-10-02 DIAGNOSIS — M25.562 PAIN IN LEFT KNEE: ICD-10-CM

## 2018-10-02 DIAGNOSIS — Z95.0 PRESENCE OF CARDIAC PACEMAKER: Chronic | ICD-10-CM

## 2018-10-02 DIAGNOSIS — Z98.890 OTHER SPECIFIED POSTPROCEDURAL STATES: Chronic | ICD-10-CM

## 2018-10-02 DIAGNOSIS — Z82.49 FAMILY HISTORY OF ISCHEMIC HEART DISEASE AND OTHER DISEASES OF THE CIRCULATORY SYSTEM: Chronic | ICD-10-CM

## 2018-10-02 DIAGNOSIS — Z88.8 ALLERGY STATUS TO OTHER DRUGS, MEDICAMENTS AND BIOLOGICAL SUBSTANCES STATUS: ICD-10-CM

## 2018-10-02 LAB
ALBUMIN SERPL ELPH-MCNC: 3.8 G/DL — SIGNIFICANT CHANGE UP (ref 3.4–5)
ALP SERPL-CCNC: 65 U/L — SIGNIFICANT CHANGE UP (ref 40–120)
ALT FLD-CCNC: 26 U/L — SIGNIFICANT CHANGE UP (ref 12–42)
ANION GAP SERPL CALC-SCNC: 4 MMOL/L — LOW (ref 9–16)
AST SERPL-CCNC: 42 U/L — HIGH (ref 15–37)
BILIRUB SERPL-MCNC: 1.5 MG/DL — HIGH (ref 0.2–1.2)
BUN SERPL-MCNC: 19 MG/DL — SIGNIFICANT CHANGE UP (ref 7–23)
CALCIUM SERPL-MCNC: 8.7 MG/DL — SIGNIFICANT CHANGE UP (ref 8.5–10.5)
CHLORIDE SERPL-SCNC: 101 MMOL/L — SIGNIFICANT CHANGE UP (ref 96–108)
CK SERPL-CCNC: 803 U/L — HIGH (ref 39–308)
CO2 SERPL-SCNC: 29 MMOL/L — SIGNIFICANT CHANGE UP (ref 22–31)
CREAT SERPL-MCNC: 1.36 MG/DL — HIGH (ref 0.5–1.3)
GLUCOSE SERPL-MCNC: 99 MG/DL — SIGNIFICANT CHANGE UP (ref 70–99)
HCT VFR BLD CALC: 44.3 % — SIGNIFICANT CHANGE UP (ref 39–50)
HGB BLD-MCNC: 14.8 G/DL — SIGNIFICANT CHANGE UP (ref 13–17)
MCHC RBC-ENTMCNC: 31.7 PG — SIGNIFICANT CHANGE UP (ref 27–34)
MCHC RBC-ENTMCNC: 33.4 G/DL — SIGNIFICANT CHANGE UP (ref 32–36)
MCV RBC AUTO: 94.9 FL — SIGNIFICANT CHANGE UP (ref 80–100)
PLATELET # BLD AUTO: 120 K/UL — LOW (ref 150–400)
POTASSIUM SERPL-MCNC: 4.2 MMOL/L — SIGNIFICANT CHANGE UP (ref 3.5–5.3)
POTASSIUM SERPL-SCNC: 4.2 MMOL/L — SIGNIFICANT CHANGE UP (ref 3.5–5.3)
PROT SERPL-MCNC: 8 G/DL — SIGNIFICANT CHANGE UP (ref 6.4–8.2)
RBC # BLD: 4.67 M/UL — SIGNIFICANT CHANGE UP (ref 4.2–5.8)
RBC # FLD: 16.4 % — HIGH (ref 10.3–14.5)
SODIUM SERPL-SCNC: 134 MMOL/L — SIGNIFICANT CHANGE UP (ref 132–145)
WBC # BLD: 8.2 K/UL — SIGNIFICANT CHANGE UP (ref 3.8–10.5)
WBC # FLD AUTO: 8.2 K/UL — SIGNIFICANT CHANGE UP (ref 3.8–10.5)

## 2018-10-02 PROCEDURE — 73552 X-RAY EXAM OF FEMUR 2/>: CPT | Mod: 26,LT

## 2018-10-02 PROCEDURE — 99284 EMERGENCY DEPT VISIT MOD MDM: CPT

## 2018-10-02 PROCEDURE — 93971 EXTREMITY STUDY: CPT | Mod: 26,LT

## 2018-10-02 PROCEDURE — 73562 X-RAY EXAM OF KNEE 3: CPT | Mod: 26,LT

## 2018-10-02 RX ORDER — CEFUROXIME AXETIL 250 MG
1 TABLET ORAL
Qty: 20 | Refills: 0 | OUTPATIENT
Start: 2018-10-02 | End: 2018-10-11

## 2018-10-02 RX ORDER — CEFUROXIME AXETIL 250 MG
1 TABLET ORAL
Qty: 20 | Refills: 0
Start: 2018-10-02 | End: 2018-10-11

## 2018-10-02 RX ORDER — TRAMADOL HYDROCHLORIDE 50 MG/1
25 TABLET ORAL ONCE
Qty: 0 | Refills: 0 | Status: DISCONTINUED | OUTPATIENT
Start: 2018-10-02 | End: 2018-10-02

## 2018-10-02 RX ADMIN — TRAMADOL HYDROCHLORIDE 25 MILLIGRAM(S): 50 TABLET ORAL at 14:38

## 2018-10-02 NOTE — ED PROVIDER NOTE - CARE PROVIDER_API CALL
Tarun Thompson), Orthopaedic Surgery Surgery  [address] [place] [address]  Phone: [phone]  Fax: [phone]

## 2018-10-02 NOTE — ED PROVIDER NOTE - PROGRESS NOTE DETAILS
Labs WNL. no acute findings on xrays or US. Likely soft tissue injury- ie meniscal injury. Will Place pt in knee immobilizer and crutches. Will give ortho f/u. Give knee redness and effusion + HIV- will also cover w/ abx prophylactically although low to no suspicion for infectious etiology.

## 2018-10-02 NOTE — ED PROVIDER NOTE - PHYSICAL EXAMINATION
LLE: superior medial aspect of knee with +edema, warmth, mild erythema. +ttp from superior medial aspect of L knee to mid-medial thigh. Compartment is soft. no ecchymosis. no abrasions or breaks in skin. NO effusion/fluctuance/crepitus. FROM w/ pain. 4/5 strength secondary to pain. + 2 pulses. sensation intact. <2 sec capillary refill    VITAL SIGNS: I have reviewed nursing notes and confirm.  CONSTITUTIONAL: Well-developed; well-nourished; in no acute distress.  SKIN: Skin is warm and dry, no acute rash.  HEAD: Normocephalic; atraumatic.  EYES: PERRL, EOM intact; conjunctiva and sclera clear.  ENT: No nasal discharge; airway clear.  NECK: Supple; non tender.  CARD: S1, S2 normal; no murmurs, gallops, or rubs. Regular rate and rhythm.  RESP: No wheezes, rales or rhonchi.  ABD: Normal bowel sounds; soft; non-distended; non-tender; no hepatosplenomegaly.  ALL OTHER EXT: Normal ROM. No clubbing, cyanosis or edema.  NEURO: Alert, oriented. Grossly unremarkable.  PSYCH: Cooperative, appropriate.

## 2018-10-02 NOTE — ED PROVIDER NOTE - OBJECTIVE STATEMENT
85 y/o M   PMHx: CAD, CABGx4, on elaquis    Pt returns to ER after being seen here on sunday s/p mechanical trip and fall. Pt fell on L knee. Had no imaging done while here. Was Dc'd with flexeril and told his dx was a muscles strain/spasm. Since discharge pt has had continued worsening pain and has noticed warmth and edema. Pt normally ambulates w/o assistance at baseline- now having difficulty with ambulation 2/2 pain. Denies any new injury/falls. Was sent by PMD for further workup. Denies fevers, chills, weakness or numbness 83 y/o M   PMHx: CAD, CABGx4, on elaquis, HIV (VL undetectable), HLD, Pacemaker s/p ablation, remote CA hx    Pt returns to ER after being seen here on sunday s/p mechanical trip and fall. Pt fell on L knee. Had no imaging done while here. Was Dc'd with flexeril and told his dx was a muscles strain/spasm. Since discharge pt has had continued worsening pain and has noticed warmth and edema. Pt normally ambulates w/o assistance at baseline- now having difficulty with ambulation 2/2 pain. Denies any new injury/falls. Was sent by PMD for further workup. Denies fevers, chills, weakness or numbness

## 2018-10-02 NOTE — ED PROVIDER NOTE - NSFOLLOWUPINSTRUCTIONS_ED_ALL_ED_FT
Follow up with Dr. Padilla within 1 week. Use knee immobilizer and no weight bearing on extremity until seen. Return to ER immediately if symptoms worsen or return.

## 2018-10-02 NOTE — ED ADULT NURSE REASSESSMENT NOTE - NS ED NURSE REASSESS COMMENT FT1
Patient medicated for pain as ordered. Tolerated well. Pending Xray results. Patient resting in stretcher in no acute distress. Call bell within reach. Safety maintained. Will continue to monitor.

## 2018-10-02 NOTE — ED ADULT NURSE NOTE - NSIMPLEMENTINTERV_GEN_ALL_ED
Implemented All Universal Safety Interventions:  [place] to call system. Call bell, personal items and telephone within reach. Instruct patient to call for assistance. Room bathroom lighting operational. Non-slip footwear when patient is off stretcher. Physically safe environment: no spills, clutter or unnecessary equipment. Stretcher in lowest position, wheels locked, appropriate side rails in place.

## 2018-10-02 NOTE — ED ADULT TRIAGE NOTE - CHIEF COMPLAINT QUOTE
pt was here on sunday for left thigh swelling. was discharged with flexeril. pt is back with more pain and swelling. pmd referred him back here

## 2018-10-03 ENCOUNTER — APPOINTMENT (OUTPATIENT)
Dept: ORTHOPEDIC SURGERY | Facility: CLINIC | Age: 83
End: 2018-10-03

## 2018-10-03 PROBLEM — Z00.00 ENCOUNTER FOR PREVENTIVE HEALTH EXAMINATION: Status: ACTIVE | Noted: 2018-10-03

## 2018-10-03 RX ORDER — TRAMADOL HYDROCHLORIDE 50 MG/1
1 TABLET ORAL
Qty: 20 | Refills: 0
Start: 2018-10-03 | End: 2018-10-07

## 2018-11-06 NOTE — ED PROVIDER NOTE - GASTROINTESTINAL [-], MLM
PT, called & stated Dr Shawna Castellon prescribed to her amiodarone (PACERONE) 400 MG tablet  PT received an e-mail from [facility] stating that the medication cost is $ [phone]  PT cannnot afford this medication and would like to know can Dr Shawna Castellno prescribed something else  Patient would like a call back  no vomiting/no nausea

## 2019-04-12 ENCOUNTER — EMERGENCY (EMERGENCY)
Facility: HOSPITAL | Age: 84
LOS: 1 days | Discharge: LEFT BEFORE DISCHARGE | End: 2019-04-12
Admitting: EMERGENCY MEDICINE

## 2019-04-12 ENCOUNTER — TRANSCRIPTION ENCOUNTER (OUTPATIENT)
Age: 84
End: 2019-04-12

## 2019-04-12 VITALS
TEMPERATURE: 98 F | DIASTOLIC BLOOD PRESSURE: 55 MMHG | RESPIRATION RATE: 16 BRPM | HEART RATE: 67 BPM | SYSTOLIC BLOOD PRESSURE: 133 MMHG | OXYGEN SATURATION: 97 %

## 2019-04-12 DIAGNOSIS — Z98.890 OTHER SPECIFIED POSTPROCEDURAL STATES: Chronic | ICD-10-CM

## 2019-04-12 DIAGNOSIS — M79.645 PAIN IN LEFT FINGER(S): ICD-10-CM

## 2019-04-12 DIAGNOSIS — Z95.0 PRESENCE OF CARDIAC PACEMAKER: Chronic | ICD-10-CM

## 2019-04-12 DIAGNOSIS — Z82.49 FAMILY HISTORY OF ISCHEMIC HEART DISEASE AND OTHER DISEASES OF THE CIRCULATORY SYSTEM: Chronic | ICD-10-CM

## 2019-04-12 NOTE — ED ADULT NURSE NOTE - NSIMPLEMENTINTERV_GEN_ALL_ED
Implemented All Universal Safety Interventions:  [place] to call system. Call bell, personal items and telephone within reach. Instruct patient to call for assistance. Room bathroom lighting operational. Non-slip footwear when patient is off stretcher. Physically safe environment: no spills, clutter or unnecessary equipment. Stretcher in lowest position, wheels locked, appropriate side rails in place.

## 2019-07-29 ENCOUNTER — EMERGENCY (EMERGENCY)
Facility: HOSPITAL | Age: 84
LOS: 1 days | Discharge: HOME | End: 2019-07-29
Attending: EMERGENCY MEDICINE | Admitting: EMERGENCY MEDICINE
Payer: MEDICARE

## 2019-07-29 VITALS
DIASTOLIC BLOOD PRESSURE: 66 MMHG | OXYGEN SATURATION: 94 % | HEART RATE: 71 BPM | SYSTOLIC BLOOD PRESSURE: 121 MMHG | TEMPERATURE: 98 F | RESPIRATION RATE: 16 BRPM

## 2019-07-29 VITALS
HEART RATE: 76 BPM | RESPIRATION RATE: 17 BRPM | OXYGEN SATURATION: 96 % | TEMPERATURE: 98 F | SYSTOLIC BLOOD PRESSURE: 124 MMHG | DIASTOLIC BLOOD PRESSURE: 68 MMHG

## 2019-07-29 DIAGNOSIS — Z98.890 OTHER SPECIFIED POSTPROCEDURAL STATES: Chronic | ICD-10-CM

## 2019-07-29 DIAGNOSIS — Z95.0 PRESENCE OF CARDIAC PACEMAKER: Chronic | ICD-10-CM

## 2019-07-29 DIAGNOSIS — Z82.49 FAMILY HISTORY OF ISCHEMIC HEART DISEASE AND OTHER DISEASES OF THE CIRCULATORY SYSTEM: Chronic | ICD-10-CM

## 2019-07-29 PROCEDURE — 99282 EMERGENCY DEPT VISIT SF MDM: CPT

## 2019-07-29 NOTE — ED ADULT NURSE NOTE - NSIMPLEMENTINTERV_GEN_ALL_ED
Implemented All Universal Safety Interventions:  [place] to call system. Call bell, personal items and telephone within reach. Instruct patient to call for assistance. Room bathroom lighting operational. Non-slip footwear when patient is off stretcher. Physically safe environment: no spills, clutter or unnecessary equipment. Stretcher in lowest position, wheels locked, appropriate side rails in place.
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
## 2019-07-29 NOTE — ED PROVIDER NOTE - NSFOLLOWUPINSTRUCTIONS_ED_ALL_ED_FT
CONTINUE TO KEEP AN EYE ON THE AREA, IF YOU BEGIN TO DEVELOP SIGNIFICANT BLEEDING OR IF THE SWELLING BECOMES LARGER AND MORE PAINFUL, RETURN TO THE EMERGENCY ROOM    YOU CAN CLEAN IT AS USUAL WITH SOAP AND WATER     TRY TO AVOID STRENUOUS ACTIVITY FOR THE NEXT 24-48HRS TO AVOID PUTTING TOO MUCH STRESS ON THE AREA

## 2019-07-29 NOTE — ED ADULT NURSE NOTE - CHPI ED NUR SYMPTOMS NEG
no pain/no blood in mucus/no purulent drainage/no redness/no fever/no head injury/no rectal pain/no drainage/no vomiting/no bleeding at site/no chills

## 2019-08-04 ENCOUNTER — EMERGENCY (EMERGENCY)
Facility: HOSPITAL | Age: 84
LOS: 1 days | Discharge: HOME | End: 2019-08-04
Attending: EMERGENCY MEDICINE | Admitting: EMERGENCY MEDICINE
Payer: MEDICARE

## 2019-08-04 VITALS
DIASTOLIC BLOOD PRESSURE: 73 MMHG | OXYGEN SATURATION: 95 % | RESPIRATION RATE: 18 BRPM | HEART RATE: 87 BPM | SYSTOLIC BLOOD PRESSURE: 131 MMHG | HEIGHT: 64 IN | TEMPERATURE: 98 F | WEIGHT: 143.08 LBS

## 2019-08-04 DIAGNOSIS — Z98.890 OTHER SPECIFIED POSTPROCEDURAL STATES: Chronic | ICD-10-CM
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
## 2019-08-04 DIAGNOSIS — Z82.49 FAMILY HISTORY OF ISCHEMIC HEART DISEASE AND OTHER DISEASES OF THE CIRCULATORY SYSTEM: Chronic | ICD-10-CM

## 2019-08-04 DIAGNOSIS — S80.811A ABRASION, RIGHT LOWER LEG, INITIAL ENCOUNTER: ICD-10-CM
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
## 2019-08-04 DIAGNOSIS — Z79.2 LONG TERM (CURRENT) USE OF ANTIBIOTICS: ICD-10-CM
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
## 2019-08-04 DIAGNOSIS — Z95.0 PRESENCE OF CARDIAC PACEMAKER: Chronic | ICD-10-CM
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
## 2019-08-04 DIAGNOSIS — S81.811A LACERATION WITHOUT FOREIGN BODY, RIGHT LOWER LEG, INITIAL ENCOUNTER: ICD-10-CM

## 2019-08-04 DIAGNOSIS — Z79.899 OTHER LONG TERM (CURRENT) DRUG THERAPY: ICD-10-CM

## 2019-08-04 DIAGNOSIS — Z79.891 LONG TERM (CURRENT) USE OF OPIATE ANALGESIC: ICD-10-CM

## 2019-08-04 PROCEDURE — 99283 EMERGENCY DEPT VISIT LOW MDM: CPT

## 2019-08-04 RX ADMIN — Medication 1 TABLET(S): at 10:37

## 2019-08-04 NOTE — ED PROVIDER NOTE - CLINICAL SUMMARY MEDICAL DECISION MAKING FREE TEXT BOX
86 y/o M arrived to the ED for a wound check s/p fall 6 days ago that resulted in a R leg abrasion. Will order ABX and reassess.
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
## 2019-08-04 NOTE — ED ADULT NURSE NOTE - CHPI ED NUR SYMPTOMS NEG
no body aches/no fever/no confusion/no vomiting/no decreased eating/drinking/no scaly patches on skin/no itching/no petechia/no chills
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
## 2019-08-04 NOTE — ED PROVIDER NOTE - CHIEF COMPLAINT
The patient is a 85y Male complaining of The patient is a 85y Male complaining of redness/pain to RIGHT leg

## 2019-08-04 NOTE — ED PROVIDER NOTE - NSFOLLOWUPINSTRUCTIONS_ED_ALL_ED_FT
Please see your primary care doctor in the next 2-3 days for a repeat evaluation.  Please take all of today's paperwork with you.  If you don't have a doctor or would like help finding a new one, please contact our call center at [phone].    Please return immediately to the Emergency Department if you have pain, fever, trouble breathing, a rash, or if you have any other problems or concerns at all.   You can reach us at [phone], 24 hours a day, 7 days a week.     Cellulitis    Cellulitis is a skin infection caused by bacteria. This condition occurs most often in the arms and lower legs but can occur anywhere over the body. Symptoms include redness, swelling, warm skin, tenderness, and chills/fever. If you were prescribed an antibiotic medicine, take it as told by your health care provider. Do not stop taking the antibiotic even if you start to feel better.    SEEK IMMEDIATE MEDICAL CARE IF YOU HAVE ANY OF THE FOLLOWING SYMPTOMS: worsening fever, red streaks coming from affected area, vomiting or diarrhea, or dizziness/lightheadedness.     Return immediately if redness grows outside of [place], red streaking begins or if fever develops.
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
## 2019-08-04 NOTE — ED PROVIDER NOTE - SKIN, MLM
Abrasion is closed. No drainage, pus, or abscess. There is tenderness, redness, and warmth surrounding both abrasions and coming together between them. No streaking. Abrasions are closed. No drainage, pus, or abscess. There is tenderness, redness, and warmth surrounding both abrasions and coming together between them. No streaking.

## 2019-08-07 NOTE — ED PROVIDER NOTE - PSH
Family history of coronary artery bypass graft surgery    H/O prior ablation treatment    History of pacemaker
145
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
## 2019-08-08 DIAGNOSIS — M79.604 PAIN IN RIGHT LEG: ICD-10-CM

## 2019-08-08 DIAGNOSIS — S81.801D UNSPECIFIED OPEN WOUND, RIGHT LOWER LEG, SUBSEQUENT ENCOUNTER: ICD-10-CM

## 2019-08-08 DIAGNOSIS — L03.115 CELLULITIS OF RIGHT LOWER LIMB: ICD-10-CM

## 2019-08-08 DIAGNOSIS — M79.89 OTHER SPECIFIED SOFT TISSUE DISORDERS: ICD-10-CM

## 2019-08-09 ENCOUNTER — EMERGENCY (EMERGENCY)
Facility: HOSPITAL | Age: 84
LOS: 1 days | Discharge: HOME | End: 2019-08-09
Attending: EMERGENCY MEDICINE | Admitting: EMERGENCY MEDICINE
Payer: MEDICARE

## 2019-08-09 VITALS
WEIGHT: 143.08 LBS | DIASTOLIC BLOOD PRESSURE: 79 MMHG | RESPIRATION RATE: 19 BRPM | HEIGHT: 64 IN | OXYGEN SATURATION: 95 % | HEART RATE: 82 BPM | SYSTOLIC BLOOD PRESSURE: 143 MMHG | TEMPERATURE: 99 F

## 2019-08-09 DIAGNOSIS — Z95.0 PRESENCE OF CARDIAC PACEMAKER: Chronic | ICD-10-CM

## 2019-08-09 DIAGNOSIS — Z98.890 OTHER SPECIFIED POSTPROCEDURAL STATES: Chronic | ICD-10-CM

## 2019-08-09 DIAGNOSIS — Z82.49 FAMILY HISTORY OF ISCHEMIC HEART DISEASE AND OTHER DISEASES OF THE CIRCULATORY SYSTEM: Chronic | ICD-10-CM

## 2019-08-09 PROCEDURE — 99282 EMERGENCY DEPT VISIT SF MDM: CPT

## 2019-08-09 NOTE — ED PROVIDER NOTE - CLINICAL SUMMARY MEDICAL DECISION MAKING FREE TEXT BOX
Here for wound check.  Healing well.  No evidence of infection.  Continue antibotics and followup. Normal pulses, movement, sensation, strength and cap refill  Normal gait.

## 2019-08-09 NOTE — ED ADULT NURSE NOTE - NSIMPLEMENTINTERV_GEN_ALL_ED
Implemented All Universal Safety Interventions:  [place] to call system. Call bell, personal items and telephone within reach. Instruct patient to call for assistance. Room bathroom lighting operational. Non-slip footwear when patient is off stretcher. Physically safe environment: no spills, clutter or unnecessary equipment. Stretcher in lowest position, wheels locked, appropriate side rails in place.

## 2019-08-09 NOTE — ED PROVIDER NOTE - PHYSICAL EXAMINATION
Awake, alert, oriented x 3, alert, nontoxic, no fever  LLE with well healing abrasions with some surrounding redness but much improved compared with last visit.  No warmth, no point tenderness, no abscess, no fluctuance, no drainage, no blistering.  No streaking.
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
## 2019-08-09 NOTE — ED ADULT TRIAGE NOTE - CHIEF COMPLAINT QUOTE
wound check to RLE. was seen 5 days for cellulitis, has been compliant with PO abx. wants to make sure he does not need another abx. denies fevers. +states wound is getting better.

## 2019-08-09 NOTE — ED PROVIDER NOTE - NSFOLLOWUPINSTRUCTIONS_ED_ALL_ED_FT
Please see your primary care doctor in the next 2-3 days for a repeat evaluation.  Please take all of today's paperwork with you.  If you don't have a doctor or would like help finding a new one, please contact our call center at [phone].    Please return immediately to the Emergency Department if you have pain, fever, trouble breathing, a rash, or if you have any other problems or concerns at all.   You can reach us at [phone], 24 hours a day, 7 days a week.

## 2019-08-13 ENCOUNTER — EMERGENCY (EMERGENCY)
Facility: HOSPITAL | Age: 84
LOS: 1 days | Discharge: HOME | End: 2019-08-13
Admitting: EMERGENCY MEDICINE
Payer: MEDICARE

## 2019-08-13 VITALS
SYSTOLIC BLOOD PRESSURE: 111 MMHG | TEMPERATURE: 98 F | WEIGHT: 141.98 LBS | OXYGEN SATURATION: 95 % | DIASTOLIC BLOOD PRESSURE: 73 MMHG | RESPIRATION RATE: 15 BRPM | HEART RATE: 90 BPM

## 2019-08-13 DIAGNOSIS — Z95.0 PRESENCE OF CARDIAC PACEMAKER: Chronic | ICD-10-CM

## 2019-08-13 DIAGNOSIS — Z48.00 ENCOUNTER FOR CHANGE OR REMOVAL OF NONSURGICAL WOUND DRESSING: ICD-10-CM

## 2019-08-13 DIAGNOSIS — Z98.890 OTHER SPECIFIED POSTPROCEDURAL STATES: Chronic | ICD-10-CM

## 2019-08-13 DIAGNOSIS — Z82.49 FAMILY HISTORY OF ISCHEMIC HEART DISEASE AND OTHER DISEASES OF THE CIRCULATORY SYSTEM: Chronic | ICD-10-CM

## 2019-08-13 DIAGNOSIS — L03.116 CELLULITIS OF LEFT LOWER LIMB: ICD-10-CM

## 2019-08-13 LAB
ALBUMIN SERPL ELPH-MCNC: 3.7 G/DL — SIGNIFICANT CHANGE UP (ref 3.4–5)
ALP SERPL-CCNC: 78 U/L — SIGNIFICANT CHANGE UP (ref 40–120)
ALT FLD-CCNC: 24 U/L — SIGNIFICANT CHANGE UP (ref 12–42)
ANION GAP SERPL CALC-SCNC: 8 MMOL/L — LOW (ref 9–16)
APTT BLD: 38.3 SEC — HIGH (ref 27.5–36.3)
AST SERPL-CCNC: 33 U/L — SIGNIFICANT CHANGE UP (ref 15–37)
BASOPHILS NFR BLD AUTO: 0.9 % — SIGNIFICANT CHANGE UP (ref 0–2)
BILIRUB SERPL-MCNC: 0.7 MG/DL — SIGNIFICANT CHANGE UP (ref 0.2–1.2)
BUN SERPL-MCNC: 25 MG/DL — HIGH (ref 7–23)
CALCIUM SERPL-MCNC: 9.4 MG/DL — SIGNIFICANT CHANGE UP (ref 8.5–10.5)
CHLORIDE SERPL-SCNC: 104 MMOL/L — SIGNIFICANT CHANGE UP (ref 96–108)
CO2 SERPL-SCNC: 29 MMOL/L — SIGNIFICANT CHANGE UP (ref 22–31)
CREAT SERPL-MCNC: 1.45 MG/DL — HIGH (ref 0.5–1.3)
EOSINOPHIL NFR BLD AUTO: 2 % — SIGNIFICANT CHANGE UP (ref 0–6)
GLUCOSE SERPL-MCNC: 131 MG/DL — HIGH (ref 70–99)
HCT VFR BLD CALC: 49.2 % — SIGNIFICANT CHANGE UP (ref 39–50)
HGB BLD-MCNC: 16.6 G/DL — SIGNIFICANT CHANGE UP (ref 13–17)
IMM GRANULOCYTES NFR BLD AUTO: 0.4 % — SIGNIFICANT CHANGE UP (ref 0–1.5)
INR BLD: 1.41 — HIGH (ref 0.88–1.16)
LACTATE SERPL-SCNC: 1.2 MMOL/L — SIGNIFICANT CHANGE UP (ref 0.4–2)
LYMPHOCYTES # BLD AUTO: 26.8 % — SIGNIFICANT CHANGE UP (ref 13–44)
MCHC RBC-ENTMCNC: 32.5 PG — SIGNIFICANT CHANGE UP (ref 27–34)
MCHC RBC-ENTMCNC: 33.7 G/DL — SIGNIFICANT CHANGE UP (ref 32–36)
MCV RBC AUTO: 96.3 FL — SIGNIFICANT CHANGE UP (ref 80–100)
MONOCYTES NFR BLD AUTO: 6.6 % — SIGNIFICANT CHANGE UP (ref 2–14)
NEUTROPHILS NFR BLD AUTO: 63.3 % — SIGNIFICANT CHANGE UP (ref 43–77)
PLATELET # BLD AUTO: 143 K/UL — LOW (ref 150–400)
POTASSIUM SERPL-MCNC: 3.8 MMOL/L — SIGNIFICANT CHANGE UP (ref 3.5–5.3)
POTASSIUM SERPL-SCNC: 3.8 MMOL/L — SIGNIFICANT CHANGE UP (ref 3.5–5.3)
PROT SERPL-MCNC: 8 G/DL — SIGNIFICANT CHANGE UP (ref 6.4–8.2)
PROTHROM AB SERPL-ACNC: 15.7 SEC — HIGH (ref 10–12.9)
RBC # BLD: 5.11 M/UL — SIGNIFICANT CHANGE UP (ref 4.2–5.8)
RBC # FLD: 13.4 % — SIGNIFICANT CHANGE UP (ref 10.3–14.5)
SODIUM SERPL-SCNC: 141 MMOL/L — SIGNIFICANT CHANGE UP (ref 132–145)
WBC # BLD: 5.6 K/UL — SIGNIFICANT CHANGE UP (ref 3.8–10.5)
WBC # FLD AUTO: 5.6 K/UL — SIGNIFICANT CHANGE UP (ref 3.8–10.5)

## 2019-08-13 PROCEDURE — 93971 EXTREMITY STUDY: CPT | Mod: 26,RT

## 2019-08-13 PROCEDURE — 99284 EMERGENCY DEPT VISIT MOD MDM: CPT

## 2019-08-13 RX ADMIN — Medication 100 MILLIGRAM(S): at 15:09

## 2019-08-13 NOTE — ED PROVIDER NOTE - CLINICAL SUMMARY MEDICAL DECISION MAKING FREE TEXT BOX
86 yo male with cellulitis not responding to augmentin without fever/chills or sig lab findings. will change to clinda. US neg for DVT
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
## 2019-08-13 NOTE — ED PROVIDER NOTE - NSFOLLOWUPINSTRUCTIONS_ED_ALL_ED_FT
rest, hydrate well    finish augmentin  start clindamycin  tylenol for pain  wash with soap and water 2 times a day  follow up with your dermatologist, if unable to follow up by Monday, return to ED for repeat evaluation  return sooner for fever, chills, any other concerns    Cellulitis    Cellulitis is a skin infection caused by bacteria. This condition occurs most often in the arms and lower legs but can occur anywhere over the body. Symptoms include redness, swelling, warm skin, tenderness, and chills/fever. If you were prescribed an antibiotic medicine, take it as told by your health care provider. Do not stop taking the antibiotic even if you start to feel better.    SEEK IMMEDIATE MEDICAL CARE IF YOU HAVE ANY OF THE FOLLOWING SYMPTOMS: worsening fever, red streaks coming from affected area, vomiting or diarrhea, or dizziness/lightheadedness.

## 2019-08-13 NOTE — ED ADULT NURSE NOTE - NSIMPLEMENTINTERV_GEN_ALL_ED
Implemented All Universal Safety Interventions:  [place] to call system. Call bell, personal items and telephone within reach. Instruct patient to call for assistance. Room bathroom lighting operational. Non-slip footwear when patient is off stretcher. Physically safe environment: no spills, clutter or unnecessary equipment. Stretcher in lowest position, wheels locked, appropriate side rails in place.

## 2019-08-13 NOTE — ED PROVIDER NOTE - SKIN, MLM
RLE with 2 closed healing abrasions to the anterior right shin. No drainage, no purulent drainage. Erythematous and warmth surrounding the anterior and lateral aspects of the shin not including the calf.

## 2019-08-19 LAB
CULTURE RESULTS: SIGNIFICANT CHANGE UP
CULTURE RESULTS: SIGNIFICANT CHANGE UP
SPECIMEN SOURCE: SIGNIFICANT CHANGE UP
SPECIMEN SOURCE: SIGNIFICANT CHANGE UP

## 2019-08-23 DIAGNOSIS — L03.115 CELLULITIS OF RIGHT LOWER LIMB: ICD-10-CM

## 2019-08-23 DIAGNOSIS — M79.661 PAIN IN RIGHT LOWER LEG: ICD-10-CM

## 2019-11-21 ENCOUNTER — EMERGENCY (EMERGENCY)
Facility: HOSPITAL | Age: 84
LOS: 1 days | Discharge: HOME | End: 2019-11-21
Attending: EMERGENCY MEDICINE | Admitting: EMERGENCY MEDICINE
Payer: MEDICARE

## 2019-11-21 VITALS
HEART RATE: 73 BPM | SYSTOLIC BLOOD PRESSURE: 117 MMHG | DIASTOLIC BLOOD PRESSURE: 65 MMHG | WEIGHT: 143.08 LBS | RESPIRATION RATE: 18 BRPM | HEIGHT: 65 IN | TEMPERATURE: 98 F | OXYGEN SATURATION: 97 %

## 2019-11-21 DIAGNOSIS — Z98.890 OTHER SPECIFIED POSTPROCEDURAL STATES: Chronic | ICD-10-CM

## 2019-11-21 DIAGNOSIS — Z82.49 FAMILY HISTORY OF ISCHEMIC HEART DISEASE AND OTHER DISEASES OF THE CIRCULATORY SYSTEM: Chronic | ICD-10-CM

## 2019-11-21 DIAGNOSIS — Z95.0 PRESENCE OF CARDIAC PACEMAKER: Chronic | ICD-10-CM

## 2019-11-21 LAB
ALBUMIN SERPL ELPH-MCNC: 3.8 G/DL — SIGNIFICANT CHANGE UP (ref 3.4–5)
ALP SERPL-CCNC: 61 U/L — SIGNIFICANT CHANGE UP (ref 40–120)
ALT FLD-CCNC: 27 U/L — SIGNIFICANT CHANGE UP (ref 12–42)
ANION GAP SERPL CALC-SCNC: 6 MMOL/L — LOW (ref 9–16)
APPEARANCE UR: CLEAR — SIGNIFICANT CHANGE UP
APTT BLD: 51.2 SEC — HIGH (ref 27.5–36.3)
AST SERPL-CCNC: 33 U/L — SIGNIFICANT CHANGE UP (ref 15–37)
BILIRUB SERPL-MCNC: 1 MG/DL — SIGNIFICANT CHANGE UP (ref 0.2–1.2)
BILIRUB UR-MCNC: NEGATIVE — SIGNIFICANT CHANGE UP
BUN SERPL-MCNC: 19 MG/DL — SIGNIFICANT CHANGE UP (ref 7–23)
CALCIUM SERPL-MCNC: 9.6 MG/DL — SIGNIFICANT CHANGE UP (ref 8.5–10.5)
CHLORIDE SERPL-SCNC: 104 MMOL/L — SIGNIFICANT CHANGE UP (ref 96–108)
CO2 SERPL-SCNC: 29 MMOL/L — SIGNIFICANT CHANGE UP (ref 22–31)
COLOR SPEC: YELLOW — SIGNIFICANT CHANGE UP
CREAT SERPL-MCNC: 1.56 MG/DL — HIGH (ref 0.5–1.3)
DIFF PNL FLD: NEGATIVE — SIGNIFICANT CHANGE UP
GLUCOSE SERPL-MCNC: 137 MG/DL — HIGH (ref 70–99)
GLUCOSE UR QL: NEGATIVE — SIGNIFICANT CHANGE UP
HCT VFR BLD CALC: 52.5 % — HIGH (ref 39–50)
HGB BLD-MCNC: 17.1 G/DL — HIGH (ref 13–17)
INR BLD: 1.43 — HIGH (ref 0.88–1.16)
KETONES UR-MCNC: NEGATIVE — SIGNIFICANT CHANGE UP
LEUKOCYTE ESTERASE UR-ACNC: NEGATIVE — SIGNIFICANT CHANGE UP
LIDOCAIN IGE QN: 120 U/L — SIGNIFICANT CHANGE UP (ref 73–393)
MCHC RBC-ENTMCNC: 32 PG — SIGNIFICANT CHANGE UP (ref 27–34)
MCHC RBC-ENTMCNC: 32.6 GM/DL — SIGNIFICANT CHANGE UP (ref 32–36)
MCV RBC AUTO: 98.3 FL — SIGNIFICANT CHANGE UP (ref 80–100)
NITRITE UR-MCNC: NEGATIVE — SIGNIFICANT CHANGE UP
NRBC # BLD: 0 /100 WBCS — SIGNIFICANT CHANGE UP (ref 0–0)
PH UR: 5 — SIGNIFICANT CHANGE UP (ref 5–8)
PLATELET # BLD AUTO: 111 K/UL — LOW (ref 150–400)
POTASSIUM SERPL-MCNC: 4.5 MMOL/L — SIGNIFICANT CHANGE UP (ref 3.5–5.3)
POTASSIUM SERPL-SCNC: 4.5 MMOL/L — SIGNIFICANT CHANGE UP (ref 3.5–5.3)
PROT SERPL-MCNC: 7.3 G/DL — SIGNIFICANT CHANGE UP (ref 6.4–8.2)
PROT UR-MCNC: NEGATIVE MG/DL — SIGNIFICANT CHANGE UP
PROTHROM AB SERPL-ACNC: 16 SEC — HIGH (ref 10–12.9)
RBC # BLD: 5.34 M/UL — SIGNIFICANT CHANGE UP (ref 4.2–5.8)
RBC # FLD: 15.2 % — HIGH (ref 10.3–14.5)
SODIUM SERPL-SCNC: 139 MMOL/L — SIGNIFICANT CHANGE UP (ref 132–145)
SP GR SPEC: 1.02 — SIGNIFICANT CHANGE UP (ref 1–1.03)
UROBILINOGEN FLD QL: 0.2 E.U./DL — SIGNIFICANT CHANGE UP
WBC # BLD: 6.03 K/UL — SIGNIFICANT CHANGE UP (ref 3.8–10.5)
WBC # FLD AUTO: 6.03 K/UL — SIGNIFICANT CHANGE UP (ref 3.8–10.5)

## 2019-11-21 PROCEDURE — 99284 EMERGENCY DEPT VISIT MOD MDM: CPT

## 2019-11-21 PROCEDURE — 74176 CT ABD & PELVIS W/O CONTRAST: CPT | Mod: 26

## 2019-11-21 RX ORDER — KETOROLAC TROMETHAMINE 30 MG/ML
30 SYRINGE (ML) INJECTION ONCE
Refills: 0 | Status: DISCONTINUED | OUTPATIENT
Start: 2019-11-21 | End: 2019-11-21

## 2019-11-21 RX ADMIN — Medication 30 MILLIGRAM(S): at 14:45

## 2019-11-21 NOTE — ED PROVIDER NOTE - CLINICAL SUMMARY MEDICAL DECISION MAKING FREE TEXT BOX
R flank pain - postherpetic neuralgia vs kidney stone, CT no hydro, some calcified vessels, no rash, had shingles recently, f/u with PMD

## 2019-11-21 NOTE — ED PROVIDER NOTE - PATIENT PORTAL LINK FT
You can access the FollowMyHealth Patient Portal offered by [facility] by registering at the following website: [email or web address]. By joining [employer]’s FollowMyHealth portal, you will also be able to view your health information using other applications (apps) compatible with our system.

## 2019-11-21 NOTE — ED ADULT TRIAGE NOTE - CHIEF COMPLAINT QUOTE
Pt presents to ED c/o right lower back pain sudden onset today. Pt reports feeling the pain x2 days ago, less severity and went away on its own. No relief with OTC medications.

## 2019-11-21 NOTE — ED PROVIDER NOTE - OBJECTIVE STATEMENT
84 y/o male with PMHx of shingles presents to ED c/o right lower back pain. Patient reports he had shingles in the right lower back 2 weeks ago, which has since resolved. However, he reports sudden onset of right lower back pain earlier today. Denies any nausea, vomiting, diarrhea, or abd pain, no recent injuries or trauma.

## 2019-11-25 DIAGNOSIS — M54.5 LOW BACK PAIN: ICD-10-CM

## 2019-11-25 DIAGNOSIS — R10.9 UNSPECIFIED ABDOMINAL PAIN: ICD-10-CM

## 2020-02-01 ENCOUNTER — EMERGENCY (EMERGENCY)
Facility: HOSPITAL | Age: 85
LOS: 1 days | Discharge: HOME | End: 2020-02-01
Admitting: EMERGENCY MEDICINE
Payer: MEDICARE

## 2020-02-01 VITALS
DIASTOLIC BLOOD PRESSURE: 73 MMHG | TEMPERATURE: 98 F | SYSTOLIC BLOOD PRESSURE: 130 MMHG | OXYGEN SATURATION: 96 % | HEART RATE: 61 BPM | RESPIRATION RATE: 16 BRPM

## 2020-02-01 VITALS
OXYGEN SATURATION: 96 % | SYSTOLIC BLOOD PRESSURE: 137 MMHG | RESPIRATION RATE: 15 BRPM | DIASTOLIC BLOOD PRESSURE: 68 MMHG | HEIGHT: 65 IN | HEART RATE: 77 BPM | TEMPERATURE: 98 F | WEIGHT: 141.1 LBS

## 2020-02-01 DIAGNOSIS — G89.29 OTHER CHRONIC PAIN: ICD-10-CM

## 2020-02-01 DIAGNOSIS — M79.672 PAIN IN LEFT FOOT: ICD-10-CM

## 2020-02-01 DIAGNOSIS — Z98.890 OTHER SPECIFIED POSTPROCEDURAL STATES: Chronic | ICD-10-CM

## 2020-02-01 DIAGNOSIS — Z82.49 FAMILY HISTORY OF ISCHEMIC HEART DISEASE AND OTHER DISEASES OF THE CIRCULATORY SYSTEM: Chronic | ICD-10-CM

## 2020-02-01 DIAGNOSIS — Z95.0 PRESENCE OF CARDIAC PACEMAKER: Chronic | ICD-10-CM

## 2020-02-01 PROCEDURE — 99283 EMERGENCY DEPT VISIT LOW MDM: CPT

## 2020-02-01 PROCEDURE — 73630 X-RAY EXAM OF FOOT: CPT | Mod: 26,LT

## 2020-02-01 RX ORDER — ACETAMINOPHEN 500 MG
975 TABLET ORAL ONCE
Refills: 0 | Status: COMPLETED | OUTPATIENT
Start: 2020-02-01 | End: 2020-02-01

## 2020-02-01 RX ADMIN — Medication 975 MILLIGRAM(S): at 12:19

## 2020-02-01 NOTE — ED PROVIDER NOTE - PROGRESS NOTE DETAILS
xray with small calcaneal spur and calcified vessel, pt reports pin point pain, not radiating no concern of claudication, advised return if any new or worsening symptoms, tylenol for pain and podiatry f/u, mens boot given

## 2020-02-01 NOTE — ED PROVIDER NOTE - NSFOLLOWUPINSTRUCTIONS_ED_ALL_ED_FT
Please follow up with podiatry as soon as possible.  Try to rest and elevate foot.  use boot for walking and tylenol for pain.  Return if any new or worsening symptoms.

## 2020-02-01 NOTE — ED PROVIDER NOTE - PATIENT PORTAL LINK FT
You can access the FollowMyHealth Patient Portal offered by [facility] by registering at the following website: [email or web address]. By joining [employer]’s FollowMyHealth portal, you will also be able to view your health information using other applications (apps) compatible with our system.

## 2020-02-01 NOTE — ED PROVIDER NOTE - PHYSICAL EXAMINATION
left heel with no skin changes, nontender to palp, foot with good color and warm to palp, dp and pt 2+ cap refill<2sec, no bumped palpated

## 2020-02-01 NOTE — ED PROVIDER NOTE - OBJECTIVE STATEMENT
84 yo male pmh quadruple bypass with 2 weeks of pain left heel, mostly with ambulation.  worsening.  states went to podiatrist about 1 week ago and said it was "nothing."  has been using heating pad at night.  denies numbness or tingling in feet or weakness.  denies trauma.

## 2020-02-02 ENCOUNTER — EMERGENCY (EMERGENCY)
Facility: HOSPITAL | Age: 85
LOS: 1 days | Discharge: HOME | End: 2020-02-02
Attending: EMERGENCY MEDICINE | Admitting: EMERGENCY MEDICINE
Payer: MEDICARE

## 2020-02-02 VITALS
OXYGEN SATURATION: 95 % | DIASTOLIC BLOOD PRESSURE: 69 MMHG | HEART RATE: 64 BPM | HEIGHT: 65 IN | RESPIRATION RATE: 17 BRPM | SYSTOLIC BLOOD PRESSURE: 137 MMHG | TEMPERATURE: 97 F | WEIGHT: 143.96 LBS

## 2020-02-02 DIAGNOSIS — Z82.49 FAMILY HISTORY OF ISCHEMIC HEART DISEASE AND OTHER DISEASES OF THE CIRCULATORY SYSTEM: Chronic | ICD-10-CM

## 2020-02-02 DIAGNOSIS — M79.672 PAIN IN LEFT FOOT: ICD-10-CM

## 2020-02-02 DIAGNOSIS — Z98.890 OTHER SPECIFIED POSTPROCEDURAL STATES: Chronic | ICD-10-CM

## 2020-02-02 DIAGNOSIS — Z95.0 PRESENCE OF CARDIAC PACEMAKER: Chronic | ICD-10-CM

## 2020-02-02 PROCEDURE — 99283 EMERGENCY DEPT VISIT LOW MDM: CPT

## 2020-02-02 RX ORDER — TRAMADOL HYDROCHLORIDE 50 MG/1
50 TABLET ORAL ONCE
Refills: 0 | Status: DISCONTINUED | OUTPATIENT
Start: 2020-02-02 | End: 2020-02-02

## 2020-02-02 RX ORDER — TRAMADOL HYDROCHLORIDE 50 MG/1
1 TABLET ORAL
Qty: 12 | Refills: 0
Start: 2020-02-02 | End: 2020-02-04

## 2020-02-02 RX ADMIN — TRAMADOL HYDROCHLORIDE 50 MILLIGRAM(S): 50 TABLET ORAL at 10:26

## 2020-02-02 NOTE — ED PROVIDER NOTE - PATIENT PORTAL LINK FT
You can access the FollowMyHealth Patient Portal offered by [facility] by registering at the following website: [email or web address]. By joining [employer]’s FollowMyHealth portal, you will also be able to view your health information using other applications (apps) compatible with our system.

## 2020-02-02 NOTE — ED PROVIDER NOTE - NSFOLLOWUPINSTRUCTIONS_ED_ALL_ED_FT
Contusion    A contusion is a deep bruise. Contusions are the result of a blunt injury to tissues and muscle fibers under the skin. The skin overlying the contusion may turn blue, purple, or yellow. Symptoms also include pain and swelling in the injured area.    SEEK IMMEDIATE MEDICAL CARE IF YOU HAVE ANY OF THE FOLLOWING SYMPTOMS: severe pain, numbness, tingling, pain, weakness, or skin color/temperature change in any part of your body distal to the injury.    -PLEASE FOLLOW-UP WITH YOUR PRIMARY CARE DOCTOR IN 1-2 DAYS.  BRING ALL PAPERWORK FROM TODAY'S VISIT TO YOUR FOLLOW-UP VISIT.  IF YOU DO NOT HAVE A PRIMARY CARE DOCTOR PLEASE REFER TO THE OFFICE/CLINIC INFORMATION GIVEN ABOVE.  YOU MAY ALSO CALL [phone] AND ASK FOR MS. BROOKLYN YEPEZ.  SHE CAN HELP YOU MAKE A FOLLOW-UP APPOINTMENT.  HER HOURS ARE 11AM-7PM MONDAY - FRIDAY.  -TAKE OVER THE COUNTER TYLENOL 650MG BY MOUTH EVERY 4-6 HOURS AS NEEDED FOR PAIN.  DO NOT MIX WITH ALCOHOL OR OTHER PRESCRIPTION MEDICATIONS THAT ALREADY CONTAIN TYLENOL OR ACETAMINOPHEN.

## 2020-02-02 NOTE — ED ADULT NURSE NOTE - CHIEF COMPLAINT QUOTE
c/o worsening left heel pain after being seen at [facility] ED yesterday. pt was told he has calcifications around artery which is causing his heel pain. denies recent injury/fall/trauma. +partial bearing weight

## 2020-02-02 NOTE — ED PROVIDER NOTE - PMH
Coronary artery disease involving native heart, angina presence unspecified, unspecified vessel or lesion type    HIV (human immunodeficiency virus infection)    HLD (hyperlipidemia)    Pacemaker

## 2020-02-02 NOTE — ED PROVIDER NOTE - MUSCULOSKELETAL, MLM
Left heel with no skin changes, nontender to palp, foot with good color and warm to palp, dp and pt 2+ cap refill<2sec, no bumped palpated Left heel with no skin changes, tender to palp in discrete area of heel, anterior calcaneous, foot with good color and warm to palp, dp and pt 2+ cap refill<2sec, no mass palpated

## 2020-02-02 NOTE — ED ADULT TRIAGE NOTE - CHIEF COMPLAINT QUOTE
c/o worsening left heel pain after being seen at [facility] ED yesterday. pt was told he has calcifications around artery which is causing his heel pain. denies recent injury/fall/trauma. +partial bearing weight

## 2020-02-02 NOTE — ED PROVIDER NOTE - NSFOLLOWUPCLINICS_GEN_ALL_ED_FT
[facility]  Podiatry  [address] [place] [address]  Phone: [phone]  Fax:

## 2020-02-02 NOTE — ED ADULT NURSE NOTE - NSIMPLEMENTINTERV_GEN_ALL_ED
Implemented All Universal Safety Interventions:  [place] to call system. Call bell, personal items and telephone within reach. Instruct patient to call for assistance. Room bathroom lighting operational. Non-slip footwear when patient is off stretcher. Physically safe environment: no spills, clutter or unnecessary equipment. Stretcher in lowest position, wheels locked, appropriate side rails in place.

## 2020-02-02 NOTE — ED PROVIDER NOTE - OBJECTIVE STATEMENT
85 y o male with PMHX of HIV, HLD, pacemaker use, and PSHx of quadruple bypass presents to ED for worsened localized left heel pain after being seen at [facility] yesterday. X-ray of foot taken read +small calcaneal spur and calcified vessel. Pt was given Tylenol for pain and encouraged to perform f/u with a podiatrist. Pain is exacerbated with ambulation. No numbness, tingling, weakness, or other sx.

## 2020-02-02 NOTE — ED ADULT NURSE NOTE - OBJECTIVE STATEMENT
AOX4 +ambulatory patient reports worsening L heel pain x 1 week. Patient was here yesterday for same complaints. Denies any trauma +pulses

## 2020-02-02 NOTE — ED PROVIDER NOTE - CLINICAL SUMMARY MEDICAL DECISION MAKING FREE TEXT BOX
Pt returns to ED for worsened left heel pain. Reviewed x-ray imaging results taken yesterday. Advised pt heating and Tylenol every 4-6 hours. Adding Tramadol for breakthrough pain.

## 2020-03-02 NOTE — ED ADULT NURSE NOTE - NS ED NURSE DISCH DISPOSITION
Chief Complaint: New/fpt patient last seen 2016    Mallampati airway Class:3  Neck Circumference:.17 Inches    [place] sleepiness score 3/2/20: .      Diagnostic Data: Psg 7/21/07  Ahi 131.6  PAP Download:   Recorded compliance dates:1/20/20-2/27/20  More than 4hour usage compliance was 66.7:%.   Average residual Apnea- Hypoapnea index on current pressue was:1.0.      PAP Type cpap   Level  14     Average usuage hours per day was:.[id number]     Interface: nasal    Provider:  [][facility]  [][facility]  [x]Ray  []Rain         [][facility] []Other: Discharged

## 2020-07-27 ENCOUNTER — EMERGENCY (EMERGENCY)
Facility: HOSPITAL | Age: 85
LOS: 1 days | Discharge: HOME | End: 2020-07-27
Admitting: EMERGENCY MEDICINE
Payer: MEDICARE

## 2020-07-27 VITALS
DIASTOLIC BLOOD PRESSURE: 90 MMHG | SYSTOLIC BLOOD PRESSURE: 151 MMHG | HEART RATE: 90 BPM | TEMPERATURE: 99 F | OXYGEN SATURATION: 95 % | WEIGHT: 160.06 LBS | RESPIRATION RATE: 18 BRPM

## 2020-07-27 DIAGNOSIS — L03.115 CELLULITIS OF RIGHT LOWER LIMB: ICD-10-CM
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
## 2020-07-27 DIAGNOSIS — Z79.899 OTHER LONG TERM (CURRENT) DRUG THERAPY: ICD-10-CM
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
## 2020-07-27 DIAGNOSIS — Z82.49 FAMILY HISTORY OF ISCHEMIC HEART DISEASE AND OTHER DISEASES OF THE CIRCULATORY SYSTEM: Chronic | ICD-10-CM

## 2020-07-27 DIAGNOSIS — Z98.890 OTHER SPECIFIED POSTPROCEDURAL STATES: Chronic | ICD-10-CM

## 2020-07-27 DIAGNOSIS — Z79.2 LONG TERM (CURRENT) USE OF ANTIBIOTICS: ICD-10-CM

## 2020-07-27 DIAGNOSIS — Z95.0 PRESENCE OF CARDIAC PACEMAKER: Chronic | ICD-10-CM

## 2020-07-27 DIAGNOSIS — Z79.1 LONG TERM (CURRENT) USE OF NON-STEROIDAL ANTI-INFLAMMATORIES (NSAID): ICD-10-CM

## 2020-07-27 DIAGNOSIS — E78.5 HYPERLIPIDEMIA, UNSPECIFIED: ICD-10-CM

## 2020-07-27 DIAGNOSIS — B20 HUMAN IMMUNODEFICIENCY VIRUS [HIV] DISEASE: ICD-10-CM

## 2020-07-27 DIAGNOSIS — M79.89 OTHER SPECIFIED SOFT TISSUE DISORDERS: ICD-10-CM
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
## 2020-07-27 DIAGNOSIS — S80.211A ABRASION, RIGHT KNEE, INITIAL ENCOUNTER: ICD-10-CM
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
## 2020-07-27 LAB
ALBUMIN SERPL ELPH-MCNC: 4 G/DL — SIGNIFICANT CHANGE UP (ref 3.4–5)
ALP SERPL-CCNC: 78 U/L — SIGNIFICANT CHANGE UP (ref 40–120)
ALT FLD-CCNC: 27 U/L — SIGNIFICANT CHANGE UP (ref 12–42)
ANION GAP SERPL CALC-SCNC: 7 MMOL/L — LOW (ref 9–16)
APTT BLD: 49.1 SEC — HIGH (ref 27.5–35.5)
AST SERPL-CCNC: 34 U/L — SIGNIFICANT CHANGE UP (ref 15–37)
BASOPHILS # BLD AUTO: 0.05 K/UL — SIGNIFICANT CHANGE UP (ref 0–0.2)
BASOPHILS NFR BLD AUTO: 0.6 % — SIGNIFICANT CHANGE UP (ref 0–2)
BILIRUB SERPL-MCNC: 1 MG/DL — SIGNIFICANT CHANGE UP (ref 0.2–1.2)
BUN SERPL-MCNC: 18 MG/DL — SIGNIFICANT CHANGE UP (ref 7–23)
CALCIUM SERPL-MCNC: 9.7 MG/DL — SIGNIFICANT CHANGE UP (ref 8.5–10.5)
CHLORIDE SERPL-SCNC: 102 MMOL/L — SIGNIFICANT CHANGE UP (ref 96–108)
CO2 SERPL-SCNC: 31 MMOL/L — SIGNIFICANT CHANGE UP (ref 22–31)
CREAT SERPL-MCNC: 1.54 MG/DL — HIGH (ref 0.5–1.3)
EOSINOPHIL # BLD AUTO: 0.11 K/UL — SIGNIFICANT CHANGE UP (ref 0–0.5)
EOSINOPHIL NFR BLD AUTO: 1.4 % — SIGNIFICANT CHANGE UP (ref 0–6)
GLUCOSE SERPL-MCNC: 87 MG/DL — SIGNIFICANT CHANGE UP (ref 70–99)
HCT VFR BLD CALC: 52.1 % — HIGH (ref 39–50)
HGB BLD-MCNC: 17.8 G/DL — HIGH (ref 13–17)
IMM GRANULOCYTES NFR BLD AUTO: 0.3 % — SIGNIFICANT CHANGE UP (ref 0–1.5)
INR BLD: 1.48 — HIGH (ref 0.88–1.16)
LYMPHOCYTES # BLD AUTO: 2.25 K/UL — SIGNIFICANT CHANGE UP (ref 1–3.3)
LYMPHOCYTES # BLD AUTO: 29.2 % — SIGNIFICANT CHANGE UP (ref 13–44)
MCHC RBC-ENTMCNC: 34.2 GM/DL — SIGNIFICANT CHANGE UP (ref 32–36)
MCHC RBC-ENTMCNC: 34.5 PG — HIGH (ref 27–34)
MCV RBC AUTO: 101 FL — HIGH (ref 80–100)
MONOCYTES # BLD AUTO: 0.69 K/UL — SIGNIFICANT CHANGE UP (ref 0–0.9)
MONOCYTES NFR BLD AUTO: 9 % — SIGNIFICANT CHANGE UP (ref 2–14)
NEUTROPHILS # BLD AUTO: 4.58 K/UL — SIGNIFICANT CHANGE UP (ref 1.8–7.4)
NEUTROPHILS NFR BLD AUTO: 59.5 % — SIGNIFICANT CHANGE UP (ref 43–77)
NRBC # BLD: 0 /100 WBCS — SIGNIFICANT CHANGE UP (ref 0–0)
PLATELET # BLD AUTO: 109 K/UL — LOW (ref 150–400)
POTASSIUM SERPL-MCNC: 4.3 MMOL/L — SIGNIFICANT CHANGE UP (ref 3.5–5.3)
POTASSIUM SERPL-SCNC: 4.3 MMOL/L — SIGNIFICANT CHANGE UP (ref 3.5–5.3)
PROT SERPL-MCNC: 7.9 G/DL — SIGNIFICANT CHANGE UP (ref 6.4–8.2)
PROTHROM AB SERPL-ACNC: 17.1 SEC — HIGH (ref 10.6–13.6)
RBC # BLD: 5.16 M/UL — SIGNIFICANT CHANGE UP (ref 4.2–5.8)
RBC # FLD: 14.9 % — HIGH (ref 10.3–14.5)
SODIUM SERPL-SCNC: 140 MMOL/L — SIGNIFICANT CHANGE UP (ref 132–145)
WBC # BLD: 7.7 K/UL — SIGNIFICANT CHANGE UP (ref 3.8–10.5)
WBC # FLD AUTO: 7.7 K/UL — SIGNIFICANT CHANGE UP (ref 3.8–10.5)

## 2020-07-27 PROCEDURE — 93971 EXTREMITY STUDY: CPT | Mod: 26,RT

## 2020-07-27 PROCEDURE — 99284 EMERGENCY DEPT VISIT MOD MDM: CPT

## 2020-07-27 RX ORDER — SODIUM CHLORIDE 9 MG/ML
1000 INJECTION INTRAMUSCULAR; INTRAVENOUS; SUBCUTANEOUS ONCE
Refills: 0 | Status: COMPLETED | OUTPATIENT
Start: 2020-07-27 | End: 2020-07-27

## 2020-07-27 RX ORDER — VANCOMYCIN HCL 1 G
1250 VIAL (EA) INTRAVENOUS ONCE
Refills: 0 | Status: COMPLETED | OUTPATIENT
Start: 2020-07-27 | End: 2020-07-27

## 2020-07-27 RX ORDER — GENTAMICIN SULFATE 40 MG/ML
100 VIAL (ML) INJECTION ONCE
Refills: 0 | Status: DISCONTINUED | OUTPATIENT
Start: 2020-07-27 | End: 2020-07-27

## 2020-07-27 RX ADMIN — SODIUM CHLORIDE 1000 MILLILITER(S): 9 INJECTION INTRAMUSCULAR; INTRAVENOUS; SUBCUTANEOUS at 19:56

## 2020-07-27 RX ADMIN — Medication 166.67 MILLIGRAM(S): at 19:00

## 2020-07-27 NOTE — ED ADULT TRIAGE NOTE - CHIEF COMPLAINT QUOTE
pt states over the weekend he had a mechanical fall injuring right knee/leg. Now has redness and swelling to knee.  Also has shingles

## 2020-07-27 NOTE — ED PROVIDER NOTE - SKIN RASH DESCRIPTION
+ erythema and warmth extending from right knee distally. No fluctuance or crepitus. Nontender. Compartments softy. Distal NV status intact.

## 2020-07-27 NOTE — ED PROVIDER NOTE - OBJECTIVE STATEMENT
87 y/o M with PMH of HLD, CAD s/p CABG on Eliquis, pacemaker for unspecified arrhythmia, and HIV (CD4 650, VL undetectable) on HAART, presents to the ED with concern for RLE infection. The patient reports having a mechanical trip and fall on the sidewalk 2 days ago in which his right knee scraped the sidewalk. He cleaned the knee abrasion at home with peroxide and applied bacitracin, however has notice progressive redness and swelling extending from the wound site extending down the right lower leg. He has been ambulating well but is concerned for infection. He mentions that he was at a Dermatology appointment this morning for unrelated Shingles on his back (being managed by his Dermatologist) and that his RLE was not nearly as reddened or swollen at the time in comparison to now.    Denies fever, chills, body aches or arthraligas, CP, SOB, palpitations, cough, hemoptysis, calf pain, bleeding, discharge, numbness or weakness

## 2020-07-27 NOTE — ED PROVIDER NOTE - PATIENT PORTAL LINK FT
You can access the FollowMyHealth Patient Portal offered by [facility] by registering at the following website: [email or web address]. By joining [employer]’s FollowMyHealth portal, you will also be able to view your health information using other applications (apps) compatible with our system.

## 2020-07-27 NOTE — ED ADULT NURSE NOTE - NSIMPLEMENTINTERV_GEN_ALL_ED
Implemented All Fall Risk Interventions:  [place] to call system. Call bell, personal items and telephone within reach. Instruct patient to call for assistance. Room bathroom lighting operational. Non-slip footwear when patient is off stretcher. Physically safe environment: no spills, clutter or unnecessary equipment. Stretcher in lowest position, wheels locked, appropriate side rails in place. Provide visual cue, wrist band, yellow gown, etc. Monitor gait and stability. Monitor for mental status changes and reorient to person, place, and time. Review medications for side effects contributing to fall risk. Reinforce activity limits and safety measures with patient and family.

## 2020-07-27 NOTE — ED PROVIDER NOTE - CLINICAL SUMMARY MEDICAL DECISION MAKING FREE TEXT BOX
Labs and US reviewed. No leukocytosis on bloodwork and no DVT on US. Pt was given dose of IV Vancomycin while here and is sitting comfortably in NAD. Afebrile and nontoxic. Will D/C with PO ABX and re-check in 24 hours. Strict return precautions reviewed with pt in which pt verbalizes understanding and agrees to.

## 2020-07-28 PROBLEM — E78.5 HYPERLIPIDEMIA, UNSPECIFIED: Chronic | Status: ACTIVE | Noted: 2020-02-02

## 2020-07-28 PROBLEM — B20 HUMAN IMMUNODEFICIENCY VIRUS [HIV] DISEASE: Chronic | Status: ACTIVE | Noted: 2020-02-02

## 2020-07-30 ENCOUNTER — EMERGENCY (EMERGENCY)
Facility: HOSPITAL | Age: 85
LOS: 1 days | Discharge: HOME | End: 2020-07-30
Attending: EMERGENCY MEDICINE | Admitting: EMERGENCY MEDICINE
Payer: MEDICARE

## 2020-07-30 VITALS
WEIGHT: 141.98 LBS | OXYGEN SATURATION: 97 % | SYSTOLIC BLOOD PRESSURE: 132 MMHG | TEMPERATURE: 99 F | HEART RATE: 101 BPM | HEIGHT: 64 IN | DIASTOLIC BLOOD PRESSURE: 79 MMHG | RESPIRATION RATE: 18 BRPM

## 2020-07-30 VITALS
TEMPERATURE: 98 F | RESPIRATION RATE: 16 BRPM | HEART RATE: 78 BPM | SYSTOLIC BLOOD PRESSURE: 158 MMHG | OXYGEN SATURATION: 97 % | DIASTOLIC BLOOD PRESSURE: 85 MMHG

## 2020-07-30 DIAGNOSIS — Z95.0 PRESENCE OF CARDIAC PACEMAKER: Chronic | ICD-10-CM

## 2020-07-30 DIAGNOSIS — Z98.890 OTHER SPECIFIED POSTPROCEDURAL STATES: Chronic | ICD-10-CM

## 2020-07-30 DIAGNOSIS — Z82.49 FAMILY HISTORY OF ISCHEMIC HEART DISEASE AND OTHER DISEASES OF THE CIRCULATORY SYSTEM: Chronic | ICD-10-CM

## 2020-07-30 PROCEDURE — 99284 EMERGENCY DEPT VISIT MOD MDM: CPT

## 2020-07-30 RX ORDER — ACETAMINOPHEN 500 MG
975 TABLET ORAL ONCE
Refills: 0 | Status: COMPLETED | OUTPATIENT
Start: 2020-07-30 | End: 2020-07-30

## 2020-07-30 RX ORDER — CEPHALEXIN 500 MG
1 CAPSULE ORAL
Qty: 40 | Refills: 0
Start: 2020-07-30 | End: 2020-08-08

## 2020-07-30 RX ORDER — VANCOMYCIN HCL 1 G
1250 VIAL (EA) INTRAVENOUS ONCE
Refills: 0 | Status: COMPLETED | OUTPATIENT
Start: 2020-07-30 | End: 2020-07-30

## 2020-07-30 RX ADMIN — Medication 166.67 MILLIGRAM(S): at 10:48

## 2020-07-30 RX ADMIN — Medication 975 MILLIGRAM(S): at 10:26

## 2020-07-30 RX ADMIN — Medication 975 MILLIGRAM(S): at 11:57

## 2020-07-30 NOTE — ED PROVIDER NOTE - ATTENDING CONTRIBUTION TO CARE
87 yo male with pmhx  HLD, CAD s/p CABG on Eliquis, pacemaker for unspecified arrhythmia, and HIV (CD4 650, VL undetectable) on HAART, fell onto his knees on 7/25, developed redness/pain/swelling to anterior R shin which was treated as cellulitis on 7/27, on clinda since that time, noting some improvement to mid-shin cellulitic area though with extension of erythema/pain to medial aspect R knee. No fever. No difficulty walking. VSS. exam significant for tachycardia to 100 regular rhythm, +7x7cm area of warm blanching tender erythema medial R knee w/out fluctuance/induration/crepitus. No joint effusion. Plan to redose vancomycin, add keflex to PO abx regimen, f/u in 48hrs. Given that pt feels he is improving, though mildly, without fever, does not at this time meet criteria for PO abx failure.

## 2020-07-30 NOTE — ED ADULT NURSE NOTE - OBJECTIVE STATEMENT
patient returning to ed for follow up from 7/27/2020 for right leg cellulitis. Redness and swelling has increased in size, tenderness and pain. No systemic symptoms. Palpable peripheral pulses. No sob, recent venous insult, period of venous stasis, or clot risk medications. Patient currently taking eliquis. Patient pending IV antibiotics, will continue to assess.

## 2020-07-30 NOTE — ED PROVIDER NOTE - PATIENT PORTAL LINK FT
You can access the FollowMyHealth Patient Portal offered by [facility] by registering at the following website: [email or web address]. By joining [employer]’s FollowMyHealth portal, you will also be able to view your health information using other applications (apps) compatible with our system.

## 2020-07-30 NOTE — ED PROVIDER NOTE - CLINICAL SUMMARY MEDICAL DECISION MAKING FREE TEXT BOX
85 yo male with pmhx  HLD, CAD s/p CABG on Eliquis, pacemaker for unspecified arrhythmia, and HIV (CD4 650, VL undetectable) on HAART, presents to the ED for re-evaluation of rash/RLE pain. Suggestive of persisting skin infection with signs of progression. no signs of sepsis, no concern for septic joint or DVT. will give IV abx dose, and will replace/supplement PO abx upon discharge with keflex. will give pain control and will reassess

## 2020-07-30 NOTE — ED PROVIDER NOTE - NSFOLLOWUPINSTRUCTIONS_ED_ALL_ED_FT
Log Out.    [employer] CareNotes®     :  [facility]             CELLULITIS - AfterCare(R) Instructions(ER/ED)     Cellulitis    WHAT YOU NEED TO KNOW:    Cellulitis is a skin infection caused by bacteria. Cellulitis may go away on its own or you may need treatment. Your healthcare provider may draw a [place] around the outside edges of your cellulitis. If your cellulitis spreads, your healthcare provider will see it outside of the [place]. Cellulitis          DISCHARGE INSTRUCTIONS:    Call 911 if:     You have sudden trouble breathing or chest pain.        Return to the emergency department if:     Your wound gets larger and more painful.       You feel a crackling under your skin when you touch it.      You have purple dots or bumps on your skin, or you see bleeding under your skin.      You have new swelling and pain in your legs.      The red, warm, swollen area gets larger.      You see red streaks coming from the infected area.    Contact your healthcare provider if:     You have a fever.      Your fever or pain does not go away or gets worse.      The area does not get smaller after 2 days of antibiotics.      Your skin is flaking or peeling off.      You have questions or concerns about your condition or care.    Medicines:     Antibiotics help treat the bacterial infection.       NSAIDs, such as ibuprofen, help decrease swelling, pain, and fever. NSAIDs can cause stomach bleeding or kidney problems in certain people. If you take blood thinner medicine, always ask if NSAIDs are safe for you. Always read the medicine label and follow directions. Do not give these medicines to children under 6 months of age without direction from your child's healthcare provider.      Acetaminophen decreases pain and fever. It is available without a doctor's order. Ask how much to take and how often to take it. Follow directions. Read the labels of all other medicines you are using to see if they also contain acetaminophen, or ask your doctor or pharmacist. Acetaminophen can cause liver damage if not taken correctly. Do not use more than 4 grams (4,000 milligrams) total of acetaminophen in one day.       Take your medicine as directed. Contact your healthcare provider if you think your medicine is not helping or if you have side effects. Tell him or her if you are allergic to any medicine. Keep a list of the medicines, vitamins, and herbs you take. Include the amounts, and when and why you take them. Bring the list or the pill bottles to follow-up visits. Carry your medicine list with you in case of an emergency.    Self-care:     Elevate the area above the level of your heart as often as you can. This will help decrease swelling and pain. Prop the area on pillows or blankets to keep it elevated comfortably.       Clean the area daily until the wound scabs over. Gently wash the area with soap and water. Pat dry. Use dressings as directed.       Place cool or warm, wet cloths on the area as directed. Use clean cloths and clean water. Leave it on the area until the cloth is room temperature. Pat the area dry with a clean, dry cloth. The cloths may help decrease pain.     Prevent cellulitis:     Do not scratch bug bites or areas of injury. You increase your risk for cellulitis by scratching these areas.       Do not share personal items, such as towels, clothing, and razors.       Clean exercise equipment with germ-killing  before and after you use it.      Wash your hands often. Use soap and water. Wash your hands after you use the bathroom, change a child's diapers, or sneeze. Wash your hands before you prepare or eat food. Use lotion to prevent dry, cracked skin. Handwashing           Wear pressure stockings as directed. You may be told to wear the stockings if you have peripheral edema. The stockings improve blood flow and decrease swelling.      Treat athlete’s foot. This can help prevent the spread of a bacterial skin infection.    Follow up with your healthcare provider within 3 days, or as directed: Your healthcare provider will check if your cellulitis is getting better. You may need different medicine. Write down your questions so you remember to ask them during your visits.

## 2020-07-30 NOTE — ED ADULT NURSE NOTE - NSIMPLEMENTINTERV_GEN_ALL_ED
Implemented All Universal Safety Interventions:  [place] to call system. Call bell, personal items and telephone within reach. Instruct patient to call for assistance. Room bathroom lighting operational. Non-slip footwear when patient is off stretcher. Physically safe environment: no spills, clutter or unnecessary equipment. Stretcher in lowest position, wheels locked, appropriate side rails in place. Implemented All Fall with Harm Risk Interventions:  [place] to call system. Call bell, personal items and telephone within reach. Instruct patient to call for assistance. Room bathroom lighting operational. Non-slip footwear when patient is off stretcher. Physically safe environment: no spills, clutter or unnecessary equipment. Stretcher in lowest position, wheels locked, appropriate side rails in place. Provide visual cue, wrist band, yellow gown, etc. Monitor gait and stability. Monitor for mental status changes and reorient to person, place, and time. Review medications for side effects contributing to fall risk. Reinforce activity limits and safety measures with patient and family. Provide visual clues: red socks.

## 2020-07-30 NOTE — ED PROVIDER NOTE - SKIN, MLM
right medial knee slightly warm to touch, with 8cm of erythema at anterior medial aspect of knee. no crepitus noted on LE BL.

## 2020-07-30 NOTE — ED PROVIDER NOTE - OBJECTIVE STATEMENT
87 yo male with pmhx  HLD, CAD s/p CABG on Eliquis, pacemaker for unspecified arrhythmia, and HIV (CD4 650, VL undetectable) on HAART, presents to the ED for rash/RLE pain. Patient had mechanical trip and fall on sidewalk 5 days ago, where he scraped his knee, washed with peroxide and placed bacitracin on it. Had initial right distal LE redness/swelling, where he came to ED 3 days ago, where labs neg, US neg, was dc'ed on clindamycin and recommended come back in 2-3 days for re-evaluation. Patient states that RLE redness and swelling have improved, but now states he has some mild redness/swelling in anterior medial knee area, with pain to touch and walking. Still able to ambulate. Denies taking any pain medications for it.     Denies fevers, falls, rash spreading to thigh, N/V/D.

## 2020-07-30 NOTE — ED PROVIDER NOTE - MUSCULOSKELETAL, MLM
mild right anterior medial knee soft tissue swelling with TTP, pain with ROM of right knee. ROM intact BL LE.

## 2020-07-31 ENCOUNTER — INPATIENT (INPATIENT)
Facility: HOSPITAL | Age: 85
LOS: 2 days | Discharge: HOME | DRG: 581 | End: 2020-08-03
Payer: MEDICARE

## 2020-07-31 VITALS
WEIGHT: 141.98 LBS | SYSTOLIC BLOOD PRESSURE: 152 MMHG | HEIGHT: 64 IN | RESPIRATION RATE: 20 BRPM | TEMPERATURE: 99 F | HEART RATE: 102 BPM | OXYGEN SATURATION: 95 % | DIASTOLIC BLOOD PRESSURE: 88 MMHG

## 2020-07-31 DIAGNOSIS — Z82.49 FAMILY HISTORY OF ISCHEMIC HEART DISEASE AND OTHER DISEASES OF THE CIRCULATORY SYSTEM: Chronic | ICD-10-CM

## 2020-07-31 DIAGNOSIS — Z95.0 PRESENCE OF CARDIAC PACEMAKER: Chronic | ICD-10-CM

## 2020-07-31 DIAGNOSIS — Z98.890 OTHER SPECIFIED POSTPROCEDURAL STATES: Chronic | ICD-10-CM

## 2020-07-31 LAB
ALBUMIN SERPL ELPH-MCNC: 3.8 G/DL — SIGNIFICANT CHANGE UP (ref 3.4–5)
ALP SERPL-CCNC: 86 U/L — SIGNIFICANT CHANGE UP (ref 40–120)
ALT FLD-CCNC: 26 U/L — SIGNIFICANT CHANGE UP (ref 12–42)
ANION GAP SERPL CALC-SCNC: 5 MMOL/L — LOW (ref 9–16)
APTT BLD: 48 SEC — HIGH (ref 27.5–35.5)
AST SERPL-CCNC: 44 U/L — HIGH (ref 15–37)
BASOPHILS # BLD AUTO: 0.05 K/UL — SIGNIFICANT CHANGE UP (ref 0–0.2)
BASOPHILS NFR BLD AUTO: 0.7 % — SIGNIFICANT CHANGE UP (ref 0–2)
BILIRUB SERPL-MCNC: 1.8 MG/DL — HIGH (ref 0.2–1.2)
BUN SERPL-MCNC: 19 MG/DL — SIGNIFICANT CHANGE UP (ref 7–23)
CALCIUM SERPL-MCNC: 9.7 MG/DL — SIGNIFICANT CHANGE UP (ref 8.5–10.5)
CHLORIDE SERPL-SCNC: 100 MMOL/L — SIGNIFICANT CHANGE UP (ref 96–108)
CK SERPL-CCNC: 186 U/L — SIGNIFICANT CHANGE UP (ref 39–308)
CO2 SERPL-SCNC: 31 MMOL/L — SIGNIFICANT CHANGE UP (ref 22–31)
CREAT SERPL-MCNC: 1.38 MG/DL — HIGH (ref 0.5–1.3)
CRP SERPL-MCNC: 4.2 MG/DL — HIGH (ref 0–0.9)
EOSINOPHIL # BLD AUTO: 0.07 K/UL — SIGNIFICANT CHANGE UP (ref 0–0.5)
EOSINOPHIL NFR BLD AUTO: 1 % — SIGNIFICANT CHANGE UP (ref 0–6)
GLUCOSE SERPL-MCNC: 92 MG/DL — SIGNIFICANT CHANGE UP (ref 70–99)
HCT VFR BLD CALC: 53.6 % — HIGH (ref 39–50)
HGB BLD-MCNC: 18.4 G/DL — HIGH (ref 13–17)
IMM GRANULOCYTES NFR BLD AUTO: 0.4 % — SIGNIFICANT CHANGE UP (ref 0–1.5)
INR BLD: 1.46 — HIGH (ref 0.88–1.16)
LACTATE SERPL-SCNC: 0.9 MMOL/L — SIGNIFICANT CHANGE UP (ref 0.4–2)
LYMPHOCYTES # BLD AUTO: 1.51 K/UL — SIGNIFICANT CHANGE UP (ref 1–3.3)
LYMPHOCYTES # BLD AUTO: 21.5 % — SIGNIFICANT CHANGE UP (ref 13–44)
MAGNESIUM SERPL-MCNC: 1.9 MG/DL — SIGNIFICANT CHANGE UP (ref 1.6–2.6)
MCHC RBC-ENTMCNC: 34.3 GM/DL — SIGNIFICANT CHANGE UP (ref 32–36)
MCHC RBC-ENTMCNC: 34.5 PG — HIGH (ref 27–34)
MCV RBC AUTO: 100.4 FL — HIGH (ref 80–100)
MONOCYTES # BLD AUTO: 0.76 K/UL — SIGNIFICANT CHANGE UP (ref 0–0.9)
MONOCYTES NFR BLD AUTO: 10.8 % — SIGNIFICANT CHANGE UP (ref 2–14)
NEUTROPHILS # BLD AUTO: 4.59 K/UL — SIGNIFICANT CHANGE UP (ref 1.8–7.4)
NEUTROPHILS NFR BLD AUTO: 65.6 % — SIGNIFICANT CHANGE UP (ref 43–77)
NRBC # BLD: 0 /100 WBCS — SIGNIFICANT CHANGE UP (ref 0–0)
PLATELET # BLD AUTO: 129 K/UL — LOW (ref 150–400)
POTASSIUM SERPL-MCNC: 4.7 MMOL/L — SIGNIFICANT CHANGE UP (ref 3.5–5.3)
POTASSIUM SERPL-SCNC: 4.7 MMOL/L — SIGNIFICANT CHANGE UP (ref 3.5–5.3)
PROT SERPL-MCNC: 8.4 G/DL — HIGH (ref 6.4–8.2)
PROTHROM AB SERPL-ACNC: 16.9 SEC — HIGH (ref 10.6–13.6)
RBC # BLD: 5.34 M/UL — SIGNIFICANT CHANGE UP (ref 4.2–5.8)
RBC # FLD: 14.6 % — HIGH (ref 10.3–14.5)
SARS-COV-2 RNA SPEC QL NAA+PROBE: SIGNIFICANT CHANGE UP
SODIUM SERPL-SCNC: 136 MMOL/L — SIGNIFICANT CHANGE UP (ref 132–145)
WBC # BLD: 7.01 K/UL — SIGNIFICANT CHANGE UP (ref 3.8–10.5)
WBC # FLD AUTO: 7.01 K/UL — SIGNIFICANT CHANGE UP (ref 3.8–10.5)

## 2020-07-31 PROCEDURE — 73590 X-RAY EXAM OF LOWER LEG: CPT | Mod: 26,RT

## 2020-07-31 PROCEDURE — 99223 1ST HOSP IP/OBS HIGH 75: CPT | Mod: GC

## 2020-07-31 PROCEDURE — 99285 EMERGENCY DEPT VISIT HI MDM: CPT | Mod: CS

## 2020-07-31 RX ORDER — VANCOMYCIN HCL 1 G
1000 VIAL (EA) INTRAVENOUS ONCE
Refills: 0 | Status: COMPLETED | OUTPATIENT
Start: 2020-07-31 | End: 2020-07-31

## 2020-07-31 RX ORDER — CEFTRIAXONE 500 MG/1
1000 INJECTION, POWDER, FOR SOLUTION INTRAMUSCULAR; INTRAVENOUS ONCE
Refills: 0 | Status: COMPLETED | OUTPATIENT
Start: 2020-07-31 | End: 2020-07-31

## 2020-07-31 RX ADMIN — Medication 250 MILLIGRAM(S): at 18:05

## 2020-07-31 RX ADMIN — CEFTRIAXONE 100 MILLIGRAM(S): 500 INJECTION, POWDER, FOR SOLUTION INTRAMUSCULAR; INTRAVENOUS at 17:20

## 2020-07-31 NOTE — ED ADULT TRIAGE NOTE - CHIEF COMPLAINT QUOTE
Patient recently seen in the ED twice in the past week for cellulitis, states the redness, swelling, and discoloration is getting worse; patient has been taking his antibiotics as prescribed; no fevers/chills

## 2020-07-31 NOTE — ED ADULT NURSE NOTE - NSIMPLEMENTINTERV_GEN_ALL_ED
Implemented All Fall with Harm Risk Interventions:  [place] to call system. Call bell, personal items and telephone within reach. Instruct patient to call for assistance. Room bathroom lighting operational. Non-slip footwear when patient is off stretcher. Physically safe environment: no spills, clutter or unnecessary equipment. Stretcher in lowest position, wheels locked, appropriate side rails in place. Provide visual cue, wrist band, yellow gown, etc. Monitor gait and stability. Monitor for mental status changes and reorient to person, place, and time. Review medications for side effects contributing to fall risk. Reinforce activity limits and safety measures with patient and family. Provide visual clues: red socks.

## 2020-07-31 NOTE — ED PROVIDER NOTE - ENMT, MLM
Normocephalic atraumatic.  Airway patent, Nasal mucosa clear. Mouth with normal mucosa. Throat has no vesicles, no oropharyngeal exudates and uvula is midline.

## 2020-07-31 NOTE — ED PROVIDER NOTE - CLINICAL SUMMARY MEDICAL DECISION MAKING FREE TEXT BOX
85 y/o M presents to ED with worsening cellulitis of RLE.  Pt well appearing, VSS, afebrile.  Pt on day 3 of Clindamycin, day 2 of Keflex and has received 2 IV doses of Vancomycin.  Tib/fib xray wet read negative.  Pt admitted to [facility] medicine for IV abx.

## 2020-07-31 NOTE — ED PROVIDER NOTE - PROGRESS NOTE DETAILS
Pt discussed with MD Rose Mary, hospitalist and MAKED[facility] via [facility] transfer center and accepted to [facility].

## 2020-07-31 NOTE — ED PROVIDER NOTE - ATTENDING CONTRIBUTION TO CARE
Patient presenting with worsening LE cellulitis. Failing outpt [facility] Patient has CAD, prev  venous harvesting for CABG on that l;eg, well controlled HIV. VSS + R LE cellulitis- mostly shin-> over knee. Admitted to [facility]. Suspect [facility] resistance.

## 2020-07-31 NOTE — ED PROVIDER NOTE - DR. NAME
This is a (at the time of admission) 26d ex FT uncircumcised male w/ fever rectal thermometer measured 101.9 and 100.5 on repeat measurement. No URI sx, sick contacts. Tolerating PO at baseline 3 oz q2. Having baseline wet diapers 8/day. Had MARYSOL earlier this month (due to paternal history of renal resection @ 2 yrs of age for unknown reasons), reportedly normal.    [facility] ED: Afebrile. CBC WNL, CSF preliminary WNL, CSF PCR panel wnl, CRP elevated. UA positive. BCx, UCx sent. Amp + gent and admit.    [facility]:  ID: Maintained on Ampicillin and Gentamicin pending culture results. Blood culture showed ___________, urine culture showed _________________________.  RENAL: 12/28 renal ultrasound mild left hydronephrosis.  FEN/GI: Maintained off of IV fluids as patient tolerated feeds. This is a (at the time of admission) 26d ex FT uncircumcised male w/ fever rectal thermometer measured 101.9 and 100.5 on repeat measurement. No URI sx, sick contacts. Tolerating PO at baseline 3 oz q2. Having baseline wet diapers 8/day. Had MARYSOL earlier this month (due to paternal history of renal resection @ 2 yrs of age for unknown reasons), reportedly normal.    [facility] ED: Afebrile. CBC WNL, CSF preliminary WNL, CSF PCR panel wnl, CRP elevated. UA positive. BCx, UCx sent. Amp + gent and admit.    [facility]:  ID: Maintained on Ampicillin and Gentamicin pending culture results. Blood culture was negative..... urine culture showed gram negative rods [id number] colony units called in 12/29.  RENAL: 12/28 renal ultrasound mild left hydronephrosis. Urology recommended repeat ultrasound outpatient in 2-3 weeks and follow up with them.  FEN/GI: Maintained off of IV fluids as patient tolerated feeds with good urine output. This is a (at the time of admission) 26d ex FT uncircumcised male w/ fever rectal thermometer measured 101.9 and 100.5 on repeat measurement. No URI sx, sick contacts. Tolerating PO at baseline 3 oz q2. Having baseline wet diapers 8/day. Had MARYSOL earlier this month (due to paternal history of renal resection @ 2 yrs of age for unknown reasons), reportedly normal.    [facility] ED: Afebrile. CBC WNL, CSF preliminary WNL, CSF PCR panel wnl, CRP elevated. UA positive. BCx, UCx sent. Amp + gent and admit.    [facility] Course:  ID: Maintained on Ampicillin and Gentamicin pending culture results. Blood culture was negative. Urine culture grew E. Coli that was pan-sensitive. Discharged on Keflex for 8 more days.   RENAL: 12/28 renal ultrasound mild left hydronephrosis. Urology recommended repeat ultrasound outpatient in 2-3 weeks.  FEN/GI: Maintained off of IV fluids as patient tolerated feeds with good urine output.    PHYSICAL EXAM    Vital Signs Last 24 Hrs  T(C): 36.7 (31 Dec 2017 10:00), Max: 37.1 (30 Dec 2017 19:46)  T(F): 98 (31 Dec 2017 10:00), Max: 98.7 (30 Dec 2017 19:46)  HR: 148 (31 Dec 2017 10:00) (144 - 166)  BP: 89/42 (31 Dec 2017 10:00) (78/45 - 89/42)  BP(mean): 58 (31 Dec 2017 07:00) (58 - 58)  RR: 40 (31 Dec 2017 10:00) (36 - 42)  SpO2: 99% (31 Dec 2017 10:00) (96% - 100%)    General: well-appearing, NAD  HEENT: NC/AT, AFSF, EOMI, PERRL, MMM, no oral lesions  Neck: no adenopathy, no meningeal signs  CV: RRR, no murmurs/rubs/gallops  Respiratory: CTAB/L  Abdomen: soft, NT/ND, bowel sounds present, no organomegaly  Extremities: no clubbing/cyanosis/edema, FROM  Skin: no rashes  Neuro: awake, alert, interactive, CN II-XII grossly intact, good tone Dr. Fields.

## 2020-07-31 NOTE — ED PROVIDER NOTE - OBJECTIVE STATEMENT
Patient is a 86 year old Male PMHx of HLD, CAD s/p CABG on Eliquis, pace maker for unspecified arrythmia and HIV on HAART ( viral load undetectable,  CD 4 650) who returns to the ED today  for a 3 rd visit c/o RLE pain and worsening redness. Patient states he sustained an abrasion below the right knee x 5 days ago s/p mechanical fall on the side walk washed with peroxide and applied bacitracin. He had initial right distal redness and swelling that is limited to his abrasion though 3 days ago he presented to this ED with redness and swelling his initial labs were normal and a DVT study was negative. He was discharged on clindamycin. He returned to the ED yesterday as he noted to have slightly increase of the redness and swelling to the anterior right knee that is limited to the abrasion he was reevaluated and Keflex was added to his treatment. Pt reports he has been taking his prescription as prescribed though presents today  with marked erythema to his entire right lower extremity with associated edema. He is able to ambulate full weight bearing  and is able to flex knee with mild discomfort. Denies fever, chills, nausea, vomiting, diarrhea, HA, numbness weakness, or tingling sensation. No CP, no cough, no SOB.      Off note patient complete a cycle of valacyclovir for possible shingles to lower back.

## 2020-07-31 NOTE — ED ADULT NURSE NOTE - OBJECTIVE STATEMENT
Patient, 86 year, male came to this unit twice in the past week for cellulitis in his right lower leg ( redness, swelling ); Pt. state that his leg Pt has been taking his antibiotics as prescribed. Pt denies fevers, chills, N/V/D.

## 2020-07-31 NOTE — ED PROVIDER NOTE - SKIN, MLM
Skin with circumferential erythema to right lower extremity from proximal tibia to the right ankle sparing the right foot. No skin crepitus, tissue compartment soft, no drainage.

## 2020-07-31 NOTE — ED ADULT NURSE REASSESSMENT NOTE - NS ED NURSE REASSESS COMMENT FT1
Pt resting in bed at this time. PT states that he does not need anything at this time. Pt pending bed at [facility].

## 2020-07-31 NOTE — ED ADULT NURSE NOTE - CHPI ED NUR SYMPTOMS NEG
no drainage/no bleeding at site/no fever/no pain/no chills/no rectal pain/no purulent drainage/no redness/no vomiting

## 2020-08-01 DIAGNOSIS — F32.9 MAJOR DEPRESSIVE DISORDER, SINGLE EPISODE, UNSPECIFIED: ICD-10-CM

## 2020-08-01 DIAGNOSIS — E78.5 HYPERLIPIDEMIA, UNSPECIFIED: ICD-10-CM

## 2020-08-01 DIAGNOSIS — R63.8 OTHER SYMPTOMS AND SIGNS CONCERNING FOOD AND FLUID INTAKE: ICD-10-CM

## 2020-08-01 DIAGNOSIS — B20 HUMAN IMMUNODEFICIENCY VIRUS [HIV] DISEASE: ICD-10-CM

## 2020-08-01 DIAGNOSIS — L03.90 CELLULITIS, UNSPECIFIED: ICD-10-CM

## 2020-08-01 DIAGNOSIS — D75.1 SECONDARY POLYCYTHEMIA: ICD-10-CM

## 2020-08-01 DIAGNOSIS — I25.10 ATHEROSCLEROTIC HEART DISEASE OF NATIVE CORONARY ARTERY WITHOUT ANGINA PECTORIS: ICD-10-CM

## 2020-08-01 LAB
ALBUMIN SERPL ELPH-MCNC: 3.9 G/DL — SIGNIFICANT CHANGE UP (ref 3.3–5)
ALP SERPL-CCNC: 71 U/L — SIGNIFICANT CHANGE UP (ref 40–120)
ALT FLD-CCNC: 16 U/L — SIGNIFICANT CHANGE UP (ref 10–45)
ANION GAP SERPL CALC-SCNC: 13 MMOL/L — SIGNIFICANT CHANGE UP (ref 5–17)
AST SERPL-CCNC: 24 U/L — SIGNIFICANT CHANGE UP (ref 10–40)
BASOPHILS # BLD AUTO: 0.06 K/UL — SIGNIFICANT CHANGE UP (ref 0–0.2)
BASOPHILS NFR BLD AUTO: 1 % — SIGNIFICANT CHANGE UP (ref 0–2)
BILIRUB SERPL-MCNC: 1.3 MG/DL — HIGH (ref 0.2–1.2)
BUN SERPL-MCNC: 16 MG/DL — SIGNIFICANT CHANGE UP (ref 7–23)
CALCIUM SERPL-MCNC: 9 MG/DL — SIGNIFICANT CHANGE UP (ref 8.4–10.5)
CHLORIDE SERPL-SCNC: 100 MMOL/L — SIGNIFICANT CHANGE UP (ref 96–108)
CO2 SERPL-SCNC: 24 MMOL/L — SIGNIFICANT CHANGE UP (ref 22–31)
CREAT SERPL-MCNC: 1.17 MG/DL — SIGNIFICANT CHANGE UP (ref 0.5–1.3)
CRP SERPL-MCNC: 3.35 MG/DL — HIGH (ref 0–0.4)
EOSINOPHIL # BLD AUTO: 0.06 K/UL — SIGNIFICANT CHANGE UP (ref 0–0.5)
EOSINOPHIL NFR BLD AUTO: 1 % — SIGNIFICANT CHANGE UP (ref 0–6)
ERYTHROCYTE [SEDIMENTATION RATE] IN BLOOD: 43 MM/HR — HIGH (ref 0–20)
GLUCOSE SERPL-MCNC: 94 MG/DL — SIGNIFICANT CHANGE UP (ref 70–99)
HCT VFR BLD CALC: 51.9 % — HIGH (ref 39–50)
HGB BLD-MCNC: 17.6 G/DL — HIGH (ref 13–17)
IMM GRANULOCYTES NFR BLD AUTO: 0.3 % — SIGNIFICANT CHANGE UP (ref 0–1.5)
LYMPHOCYTES # BLD AUTO: 1.42 K/UL — SIGNIFICANT CHANGE UP (ref 1–3.3)
LYMPHOCYTES # BLD AUTO: 22.6 % — SIGNIFICANT CHANGE UP (ref 13–44)
MAGNESIUM SERPL-MCNC: 1.9 MG/DL — SIGNIFICANT CHANGE UP (ref 1.6–2.6)
MCHC RBC-ENTMCNC: 33.9 GM/DL — SIGNIFICANT CHANGE UP (ref 32–36)
MCHC RBC-ENTMCNC: 34 PG — SIGNIFICANT CHANGE UP (ref 27–34)
MCV RBC AUTO: 100.4 FL — HIGH (ref 80–100)
MONOCYTES # BLD AUTO: 0.72 K/UL — SIGNIFICANT CHANGE UP (ref 0–0.9)
MONOCYTES NFR BLD AUTO: 11.5 % — SIGNIFICANT CHANGE UP (ref 2–14)
NEUTROPHILS # BLD AUTO: 4 K/UL — SIGNIFICANT CHANGE UP (ref 1.8–7.4)
NEUTROPHILS NFR BLD AUTO: 63.6 % — SIGNIFICANT CHANGE UP (ref 43–77)
NRBC # BLD: 0 /100 WBCS — SIGNIFICANT CHANGE UP (ref 0–0)
PLATELET # BLD AUTO: 113 K/UL — LOW (ref 150–400)
POTASSIUM SERPL-MCNC: 4.2 MMOL/L — SIGNIFICANT CHANGE UP (ref 3.5–5.3)
POTASSIUM SERPL-SCNC: 4.2 MMOL/L — SIGNIFICANT CHANGE UP (ref 3.5–5.3)
PROT SERPL-MCNC: 7.1 G/DL — SIGNIFICANT CHANGE UP (ref 6–8.3)
RBC # BLD: 5.17 M/UL — SIGNIFICANT CHANGE UP (ref 4.2–5.8)
RBC # FLD: 14.9 % — HIGH (ref 10.3–14.5)
SODIUM SERPL-SCNC: 137 MMOL/L — SIGNIFICANT CHANGE UP (ref 135–145)
WBC # BLD: 6.28 K/UL — SIGNIFICANT CHANGE UP (ref 3.8–10.5)
WBC # FLD AUTO: 6.28 K/UL — SIGNIFICANT CHANGE UP (ref 3.8–10.5)

## 2020-08-01 PROCEDURE — 73562 X-RAY EXAM OF KNEE 3: CPT | Mod: 26,RT

## 2020-08-01 PROCEDURE — 73701 CT LOWER EXTREMITY W/DYE: CPT | Mod: 26,RT

## 2020-08-01 PROCEDURE — 12345: CPT | Mod: NC,GC

## 2020-08-01 RX ORDER — ATORVASTATIN CALCIUM 80 MG/1
20 TABLET, FILM COATED ORAL AT BEDTIME
Refills: 0 | Status: DISCONTINUED | OUTPATIENT
Start: 2020-08-01 | End: 2020-08-03

## 2020-08-01 RX ORDER — ALPRAZOLAM 0.25 MG
0 TABLET ORAL
Qty: 0 | Refills: 0 | DISCHARGE

## 2020-08-01 RX ORDER — MIRTAZAPINE 45 MG/1
1 TABLET, ORALLY DISINTEGRATING ORAL
Qty: 0 | Refills: 0 | DISCHARGE

## 2020-08-01 RX ORDER — APIXABAN 2.5 MG/1
2.5 TABLET, FILM COATED ORAL
Qty: 0 | Refills: 0 | DISCHARGE

## 2020-08-01 RX ORDER — VALACYCLOVIR 500 MG/1
0 TABLET, FILM COATED ORAL
Qty: 0 | Refills: 0 | DISCHARGE

## 2020-08-01 RX ORDER — EMTRICITABINE, RILPIVIRINE HYDROCHLORIDE, AND TENOFOVIR DISOPROXIL FUMARATE 200; 25; 300 MG/1; MG/1; MG/1
1 TABLET, FILM COATED ORAL EVERY 24 HOURS
Refills: 0 | Status: DISCONTINUED | OUTPATIENT
Start: 2020-08-01 | End: 2020-08-03

## 2020-08-01 RX ORDER — VANCOMYCIN HCL 1 G
1000 VIAL (EA) INTRAVENOUS EVERY 12 HOURS
Refills: 0 | Status: COMPLETED | OUTPATIENT
Start: 2020-08-01 | End: 2020-08-02

## 2020-08-01 RX ORDER — CEFAZOLIN SODIUM 1 G
2000 VIAL (EA) INJECTION EVERY 8 HOURS
Refills: 0 | Status: DISCONTINUED | OUTPATIENT
Start: 2020-08-01 | End: 2020-08-02

## 2020-08-01 RX ORDER — APIXABAN 2.5 MG/1
0 TABLET, FILM COATED ORAL
Qty: 0 | Refills: 0 | DISCHARGE

## 2020-08-01 RX ORDER — CEFAZOLIN SODIUM 1 G
2000 VIAL (EA) INJECTION ONCE
Refills: 0 | Status: COMPLETED | OUTPATIENT
Start: 2020-08-01 | End: 2020-08-01

## 2020-08-01 RX ORDER — SUCRALFATE 1 G
1 TABLET ORAL
Refills: 0 | Status: DISCONTINUED | OUTPATIENT
Start: 2020-08-01 | End: 2020-08-03

## 2020-08-01 RX ORDER — ATORVASTATIN CALCIUM 80 MG/1
1 TABLET, FILM COATED ORAL
Qty: 0 | Refills: 0 | DISCHARGE

## 2020-08-01 RX ORDER — VANCOMYCIN HCL 1 G
1000 VIAL (EA) INTRAVENOUS EVERY 24 HOURS
Refills: 0 | Status: DISCONTINUED | OUTPATIENT
Start: 2020-08-01 | End: 2020-08-01

## 2020-08-01 RX ORDER — APIXABAN 2.5 MG/1
2.5 TABLET, FILM COATED ORAL EVERY 12 HOURS
Refills: 0 | Status: DISCONTINUED | OUTPATIENT
Start: 2020-08-01 | End: 2020-08-03

## 2020-08-01 RX ORDER — VALACYCLOVIR 500 MG/1
500 TABLET, FILM COATED ORAL EVERY 24 HOURS
Refills: 0 | Status: DISCONTINUED | OUTPATIENT
Start: 2020-08-01 | End: 2020-08-01

## 2020-08-01 RX ORDER — ALPRAZOLAM 0.25 MG
1 TABLET ORAL EVERY 24 HOURS
Refills: 0 | Status: DISCONTINUED | OUTPATIENT
Start: 2020-08-01 | End: 2020-08-03

## 2020-08-01 RX ORDER — EMTRICITABINE, RILPIVIRINE HYDROCHLORIDE, AND TENOFOVIR DISOPROXIL FUMARATE 200; 25; 300 MG/1; MG/1; MG/1
1 TABLET, FILM COATED ORAL
Qty: 0 | Refills: 0 | DISCHARGE

## 2020-08-01 RX ORDER — VANCOMYCIN HCL 1 G
VIAL (EA) INTRAVENOUS
Refills: 0 | Status: DISCONTINUED | OUTPATIENT
Start: 2020-08-01 | End: 2020-08-01

## 2020-08-01 RX ORDER — SUCRALFATE 1 G
0 TABLET ORAL
Qty: 0 | Refills: 0 | DISCHARGE

## 2020-08-01 RX ORDER — MIRTAZAPINE 45 MG/1
15 TABLET, ORALLY DISINTEGRATING ORAL EVERY 24 HOURS
Refills: 0 | Status: DISCONTINUED | OUTPATIENT
Start: 2020-08-01 | End: 2020-08-03

## 2020-08-01 RX ORDER — VALACYCLOVIR 500 MG/1
500 TABLET, FILM COATED ORAL
Refills: 0 | Status: DISCONTINUED | OUTPATIENT
Start: 2020-08-01 | End: 2020-08-03

## 2020-08-01 RX ORDER — CEFAZOLIN SODIUM 1 G
VIAL (EA) INJECTION
Refills: 0 | Status: DISCONTINUED | OUTPATIENT
Start: 2020-08-01 | End: 2020-08-02

## 2020-08-01 RX ADMIN — Medication 100 MILLIGRAM(S): at 07:01

## 2020-08-01 RX ADMIN — EMTRICITABINE, RILPIVIRINE HYDROCHLORIDE, AND TENOFOVIR DISOPROXIL FUMARATE 1 TABLET(S): 200; 25; 300 TABLET, FILM COATED ORAL at 01:39

## 2020-08-01 RX ADMIN — APIXABAN 2.5 MILLIGRAM(S): 2.5 TABLET, FILM COATED ORAL at 18:28

## 2020-08-01 RX ADMIN — Medication 1 GRAM(S): at 06:00

## 2020-08-01 RX ADMIN — Medication 100 MILLIGRAM(S): at 21:33

## 2020-08-01 RX ADMIN — Medication 1 MILLIGRAM(S): at 21:33

## 2020-08-01 RX ADMIN — APIXABAN 2.5 MILLIGRAM(S): 2.5 TABLET, FILM COATED ORAL at 05:59

## 2020-08-01 RX ADMIN — EMTRICITABINE, RILPIVIRINE HYDROCHLORIDE, AND TENOFOVIR DISOPROXIL FUMARATE 1 TABLET(S): 200; 25; 300 TABLET, FILM COATED ORAL at 23:21

## 2020-08-01 RX ADMIN — ATORVASTATIN CALCIUM 20 MILLIGRAM(S): 80 TABLET, FILM COATED ORAL at 21:33

## 2020-08-01 RX ADMIN — MIRTAZAPINE 15 MILLIGRAM(S): 45 TABLET, ORALLY DISINTEGRATING ORAL at 21:33

## 2020-08-01 RX ADMIN — Medication 100 MILLIGRAM(S): at 13:50

## 2020-08-01 RX ADMIN — Medication 250 MILLIGRAM(S): at 13:50

## 2020-08-01 NOTE — H&P ADULT - PROBLEM SELECTOR PROBLEM 5
Coronary artery disease involving native heart, angina presence unspecified, unspecified vessel or lesion type Depression

## 2020-08-01 NOTE — PROGRESS NOTE ADULT - PROBLEM SELECTOR PLAN 1
RLE extremity cellulitis refractory to empiric po antibiotic treatment (clindamycin, keflex) + Vancomycin. Pt with HIV currently well controlled on Odefsey (VLUD, CD4 650) however vein used for CABG was harvested from affected leg. Received 1g Ceftriaxone and 1g vancomycin IV in ED  -c/w vancomycin 1g q12 hours (MRSA coverage) and Ancef 2g q8h (MSSA coverage)  -f/u BCx  -CT with area of fluid by patella, poss effusion v abscess. does not extend to bone   - ortho consulted for poss joint involvement, f/u recs

## 2020-08-01 NOTE — H&P ADULT - NSHPPHYSICALEXAM_GEN_ALL_CORE
VITAL SIGNS:  ICU Vital Signs Last 24 Hrs  T(C): 36.8 (31 Jul 2020 22:55), Max: 37.1 (31 Jul 2020 14:45)  T(F): 98.3 (31 Jul 2020 22:55), Max: 98.7 (31 Jul 2020 14:45)  HR: 88 (31 Jul 2020 22:55) (72 - 102)  BP: 136/71 (31 Jul 2020 22:55) (136/71 - 157/80)  BP(mean): --  ABP: --  ABP(mean): --  RR: 16 (31 Jul 2020 21:28) (16 - 20)  SpO2: 95% (31 Jul 2020 22:55) (95% - 97%)    CAPILLARY BLOOD GLUCOSE    PHYSICAL EXAM:    Constitutional: NAD, thin elderly man  HEENT: NC/AT; PERRL, anicteric sclera; MMM  Neck: supple, no JVD, no cervical LAD  Cardiovascular: L PPM, +S1/S2, RRR  Respiratory: CTA B/L, no W/R/R, no increased work of breathing  Gastrointestinal: abdomen soft, NT/ND; no rebound or guarding; +BSx4  Genitourinary: no suprapubic tenderness or fullness  Extremities: WWP; no clubbing or cyanosis, increased warmth and erythema surrounding scab on R knee with marked edema around knee joint, chronic venous stasis changes and erythema on RLE, range of motion of R knee limited by pain  Vascular: 2+ radial, DP/PT pulses B/L  Dermatologic: normal color and turgor; no visible rashes  Neurological:  AAOx3; no focal neurological deficits

## 2020-08-01 NOTE — H&P ADULT - ASSESSMENT
86M w HIV (on Odefsey, VL UD, CD4 650), HLD, CAD s/p CABG (2014, on Eliquis), pacemaker for unspecified arrythmia (2016) with 2 prior ED visits (7/27, 7/30) for RLE cellulitis for which he was initially prescribed Clindamycin (7/27), then Keflex (7/30) plus 2 doses of IV Vanc, now admitted for management of RLE cellulitis.

## 2020-08-01 NOTE — H&P ADULT - HISTORY OF PRESENT ILLNESS
86M w HIV (on Odefsey, VL UD, CD4 650), HLD, CAD s/p CABG (2014, on Eliquis), pacemaker for unspecified arrythmia (2016) with 2 prior ED visits (7/27, 7/30) for RLE cellulitis for which he was initially prescribed Clindamycin + 1g Vancomycin (7/27), then Keflex + 1g Vancomycin(7/30), returned to the ED for a 3rd visit complaining of progressively worsening erythema and RLE pain. Per pt, he sustained a R knee abrasion from a mechanical fall 5 days ago, washed it with hydrogen peroxide and treated it with topical bacitracin. He is able to ambulate full weight bearing  and is able to flex knee with mild discomfort. Denies fever, chills, nausea, vomiting, diarrhea, HA, numbness weakness, or tingling.     ED Vitals: Tmax 98.9 HR 80-90s -150/90 RR 18 SpO2 95-97% RA  ED Labs: WBC nml, H/H 18.4/53.6 Plt 129 Cr 1.38 (appears to be baseline)  LE Doppler: no DVT  ED Course: 1g Vancomycin, 1g Ceftriaxone

## 2020-08-01 NOTE — PROGRESS NOTE ADULT - PROBLEM SELECTOR PLAN 2
Hgb currently elevated 17-18, previously 14-15. Possible hemoconcentration, although pt not dry on exam. No hx of lung disease or PATRICIA, and thin habitus makes this less likely.  -trend H/H

## 2020-08-01 NOTE — H&P ADULT - NSICDXPASTSURGICALHX_GEN_ALL_CORE_FT
PAST SURGICAL HISTORY:  Family history of coronary artery bypass graft surgery     H/O prior ablation treatment     History of pacemaker

## 2020-08-01 NOTE — H&P ADULT - NSICDXPASTMEDICALHX_GEN_ALL_CORE_FT
PAST MEDICAL HISTORY:  Coronary artery disease involving native heart, angina presence unspecified, unspecified vessel or lesion type     HIV (human immunodeficiency virus infection)     HLD (hyperlipidemia)     Pacemaker

## 2020-08-01 NOTE — H&P ADULT - NSHPSOCIALHISTORY_GEN_ALL_CORE
Lives alone but monogamous w M partner of 25 years. Remote smoking history (age [age]), denies alcohol use, occasional marijuana use

## 2020-08-01 NOTE — PROGRESS NOTE ADULT - PROBLEM SELECTOR PLAN 7
F: none  E: replete PRN  N: DASH/TLC  DVT ppx: on eliquis  Code: does not want extreme measures, but does not have any documentation with him  Dispo: [facility]

## 2020-08-01 NOTE — PROGRESS NOTE ADULT - SUBJECTIVE AND OBJECTIVE BOX
JOSH LOMELI, 86y, Male  MRN-[id number]  Patient is a 86y old  Male who presents with a chief complaint of RLE cellulitis (01 Aug 2020 00:31)      HPI:     SUBJECTIVE:  -------------------------------------------------------------------------------  VITAL SIGNS:  Vital Signs Last 24 Hrs  T(C): 36.9 (01 Aug 2020 04:57), Max: 37.1 (31 Jul 2020 14:45)  T(F): 98.5 (01 Aug 2020 04:57), Max: 98.7 (31 Jul 2020 14:45)  HR: 74 (01 Aug 2020 04:57) (72 - 102)  BP: 113/64 (01 Aug 2020 04:57) (113/64 - 157/80)  BP(mean): --  RR: 16 (01 Aug 2020 04:57) (16 - 20)  SpO2: 97% (01 Aug 2020 04:57) (95% - 97%)  I&O's Summary      PHYSICAL EXAM:    General: NAD, well developed  HEENT: NC/AT; EOMI, PERRLA, anicteric sclera; moist mucosal membranes.  Neck: supple, trachea midline  Cardiovascular: RRR, +S1/S2; NO M/R/G  Respiratory: CTA B/L; no W/R/R  Gastrointestinal: soft, NT/ND; +BSx4  Extremities: WWP; no edema or cyanosis  Vascular: 2+ radial, DP/PT pulses B/L  Neurological: AAOx3; no focal deficits    ALLERGIES:  Allergies    Abacavir Sulfate (Flushing; Hives)  Bactrim (Short breath; Swelling)    Intolerances        MEDICATIONS:  MEDICATIONS  (STANDING):  ALPRAZolam 1 milliGRAM(s) Oral every 24 hours  apixaban 2.5 milliGRAM(s) Oral every 12 hours  atorvastatin 20 milliGRAM(s) Oral at bedtime  ceFAZolin   IVPB      ceFAZolin   IVPB 2000 milliGRAM(s) IV Intermittent every 8 hours  emtricitabine 200 mG/rilpivirine 25 mG/tenofovir alafenamide 25 mG (ODEFSEY) Tablet 1 Tablet(s) Oral every 24 hours  mirtazapine 15 milliGRAM(s) Oral every 24 hours  sucralfate 1 Gram(s) Oral <User Schedule>  valACYclovir 500 milliGRAM(s) Oral two times a day  vancomycin  IVPB 1000 milliGRAM(s) IV Intermittent every 24 hours    MEDICATIONS  (PRN):      -------------------------------------------------------------------------------  LABS:                        17.6   6.28  )-----------( 113      ( 01 Aug 2020 08:28 )             51.9     08-01    137  |  100  |  16  ----------------------------<  94  4.2   |  24  |  1.17    Ca    9.0      01 Aug 2020 08:28  Mg     1.9     08-01    TPro  7.1  /  Alb  3.9  /  TBili  1.3<H>  /  DBili  x   /  AST  24  /  ALT  16  /  AlkPhos  71  08-01    LIVER FUNCTIONS - ( 01 Aug 2020 08:28 )  Alb: 3.9 g/dL / Pro: 7.1 g/dL / ALK PHOS: 71 U/L / ALT: 16 U/L / AST: 24 U/L / GGT: x           PT/INR - ( 31 Jul 2020 16:01 )   PT: 16.9 sec;   INR: 1.46          PTT - ( 31 Jul 2020 16:01 )  PTT:48.0 sec    CAPILLARY BLOOD GLUCOSE          RADIOLOGY & ADDITIONAL TESTS: Reviewed. JOSH LOMELI, 86y, Male  MRN-[id number]  Patient is a 86y old  Male who presents with a chief complaint of RLE cellulitis (01 Aug 2020 00:31)      SUBJECTIVE: Pt seen and examined at bedside. area of erythema on R leg marked with skin marker. Has had infection like this previously on same leg. Denies fevers, chills, SOB, chest, pain, abdominal pain, N/V.   -------------------------------------------------------------------------------  VITAL SIGNS:  Vital Signs Last 24 Hrs  T(C): 36.9 (01 Aug 2020 04:57), Max: 37.1 (31 Jul 2020 14:45)  T(F): 98.5 (01 Aug 2020 04:57), Max: 98.7 (31 Jul 2020 14:45)  HR: 74 (01 Aug 2020 04:57) (72 - 102)  BP: 113/64 (01 Aug 2020 04:57) (113/64 - 157/80)  BP(mean): --  RR: 16 (01 Aug 2020 04:57) (16 - 20)  SpO2: 97% (01 Aug 2020 04:57) (95% - 97%)  I&O's Summary      PHYSICAL EXAM:    General: NAD, well developed  HEENT: NC/AT; EOMI, PERRLA, anicteric sclera; moist mucosal membranes.  Neck: supple, trachea midline  Cardiovascular: RRR, +S1/S2; NO M/R/G  Respiratory: CTA B/L; no W/R/R  Gastrointestinal: soft, NT/ND; +BSx4  Extremities: WWP; no edema or cyanosis. RLE is minimally enlarged compared to left, erythema at patella and tracking downward along shin. nontender to palpation   Vascular: 2+ radial, DP/PT pulses B/L  Neurological: AAOx3; no focal deficits    ALLERGIES:  Allergies    Abacavir Sulfate (Flushing; Hives)  Bactrim (Short breath; Swelling)    Intolerances        MEDICATIONS:  MEDICATIONS  (STANDING):  ALPRAZolam 1 milliGRAM(s) Oral every 24 hours  apixaban 2.5 milliGRAM(s) Oral every 12 hours  atorvastatin 20 milliGRAM(s) Oral at bedtime  ceFAZolin   IVPB      ceFAZolin   IVPB 2000 milliGRAM(s) IV Intermittent every 8 hours  emtricitabine 200 mG/rilpivirine 25 mG/tenofovir alafenamide 25 mG (ODEFSEY) Tablet 1 Tablet(s) Oral every 24 hours  mirtazapine 15 milliGRAM(s) Oral every 24 hours  sucralfate 1 Gram(s) Oral <User Schedule>  valACYclovir 500 milliGRAM(s) Oral two times a day  vancomycin  IVPB 1000 milliGRAM(s) IV Intermittent every 24 hours    MEDICATIONS  (PRN):      -------------------------------------------------------------------------------  LABS:                        17.6   6.28  )-----------( 113      ( 01 Aug 2020 08:28 )             51.9     08-01    137  |  100  |  16  ----------------------------<  94  4.2   |  24  |  1.17    Ca    9.0      01 Aug 2020 08:28  Mg     1.9     08-01    TPro  7.1  /  Alb  3.9  /  TBili  1.3<H>  /  DBili  x   /  AST  24  /  ALT  16  /  AlkPhos  71  08-01    LIVER FUNCTIONS - ( 01 Aug 2020 08:28 )  Alb: 3.9 g/dL / Pro: 7.1 g/dL / ALK PHOS: 71 U/L / ALT: 16 U/L / AST: 24 U/L / GGT: x           PT/INR - ( 31 Jul 2020 16:01 )   PT: 16.9 sec;   INR: 1.46          PTT - ( 31 Jul 2020 16:01 )  PTT:48.0 sec    CAPILLARY BLOOD GLUCOSE          RADIOLOGY & ADDITIONAL TESTS: Reviewed.

## 2020-08-01 NOTE — H&P ADULT - PROBLEM SELECTOR PLAN 1
RLE extremity cellulitis refractory to empiric po antibiotic treatment (clindamycin, keflex) + Vancomycin. Pt with HIV currently well controlled on Odefsey (VLUD, CD4 650) however vein used for CABG was harvested from affected leg. Received 1g Ceftriaxone and 1g vancomycin IV in ED  -c/w Ceftriaxone and vancomycin until cultures result  -f/u BCx RLE extremity cellulitis refractory to empiric po antibiotic treatment (clindamycin, keflex) + Vancomycin. Pt with HIV currently well controlled on Odefsey (VLUD, CD4 650) however vein used for CABG was harvested from affected leg. Received 1g Ceftriaxone and 1g vancomycin IV in ED  -c/w vancomycin 1g and Ancef  -f/u BCx RLE extremity cellulitis refractory to empiric po antibiotic treatment (clindamycin, keflex) + Vancomycin. Pt with HIV currently well controlled on Odefsey (VLUD, CD4 650) however vein used for CABG was harvested from affected leg. Received 1g Ceftriaxone and 1g vancomycin IV in ED  -c/w vancomycin 1g q12h (MRSA coverage) and Ancef 2g q8h (MSSA coverage)  -f/u BCx  -f/u CT, concerns for possible abscess RLE extremity cellulitis refractory to empiric po antibiotic treatment (clindamycin, keflex) + Vancomycin. Pt with HIV currently well controlled on Odefsey (VLUD, CD4 650) however vein used for CABG was harvested from affected leg. Received 1g Ceftriaxone and 1g vancomycin IV in ED  -c/w vancomycin 1g q24h (MRSA coverage) and Ancef 2g q8h (MSSA coverage)  -f/u BCx  -f/u CT, concerns for possible abscess

## 2020-08-01 NOTE — H&P ADULT - PROBLEM SELECTOR PLAN 3
-c/w atorvastatin 20mg qhs Compliant and well controlled on Odefsey (VLUD, CD4 650)  -c/w Odefsey 1 tablet q24h  -c/w Valtrex 500mg q12h

## 2020-08-01 NOTE — H&P ADULT - PROBLEM SELECTOR PLAN 6
F:  E: replete PRN  N: DASH/TLC  DVT ppx: on eliquis  Code: does not want extreme measures, but does not have any documentation with him  Dispo: [facility] -c/w Eliquis 2.5mg q12h

## 2020-08-01 NOTE — H&P ADULT - PROBLEM SELECTOR PLAN 7
F:  E: replete PRN  N: DASH/TLC  DVT ppx: on eliquis  Code: does not want extreme measures, but does not have any documentation with him  Dispo: [facility] F: none  E: replete PRN  N: DASH/TLC  DVT ppx: on eliquis  Code: does not want extreme measures, but does not have any documentation with him  Dispo: [facility]

## 2020-08-01 NOTE — H&P ADULT - PROBLEM SELECTOR PLAN 2
Compliant and well controlled on Odefsey (VLUD, CD4 650)  -c/w Odefsey 1 tablet q24h  -c/w Valtrex 500mg q12h Hgb currently elevated 17-18, previously 14-15. Possible hemoconcentration, although pt not dry on exam. No hx of lung disease or PATRICIA, and thin habitus makes this less likely.  -trend H/H

## 2020-08-01 NOTE — H&P ADULT - ATTENDING COMMENTS
86M PMHx HIV (on Odefsey, VL UD, CD4 650), HLD, CAD s/p CABG (2014), s/p PPM – Afib? (on Eliquis, 2016) – presents for non-healing RLE cellulitis incurred due to fall with failure of PO antibiotics on 2 occasions.     On exam - the RLE is erythematous from below the knee to the ankle and is warm and tender. On the knee, there is a circular scab below patella and adjacent to it there is a localized area of fluctuance, not expressable.     1. Mod-Severe Cellulitis   - Given failure of PO antibiotics (Clindamycin and Keflex plus 2x IV Vanc in ED), this is already classified as a moderate cellulitis.   - On exam, there is no obvious purulence, but there is a notable area of fluctuance, c/f possible abscess   - Given this, c/w coverage of MRSA and MSSA w/ Vanc + Ancef   - CT ordered to further evaluate given elevated inflammatory markers and c/f collection.     2. Polycythemia   - Prior Hgb 14-16 - currently 17-18 - potentially a relative polycythemia due to hemoconcentration. Patient however doesn't appear dry on exam.   - For secondary causes: no known lung disease or h/o PATRICIA - thin habitus also not suggestive.   - Cont to monitor.

## 2020-08-01 NOTE — H&P ADULT - PROBLEM SELECTOR PROBLEM 6
Nutrition, metabolism, and development symptoms Coronary artery disease involving native heart, angina presence unspecified, unspecified vessel or lesion type

## 2020-08-01 NOTE — H&P ADULT - NSHPLABSRESULTS_GEN_ALL_CORE
LABS:                        18.4   7.01  )-----------( 129      ( 31 Jul 2020 16:01 )             53.6     07-31    136  |  100  |  19  ----------------------------<  92  4.7   |  31  |  1.38<H>    Ca    9.7      31 Jul 2020 16:01  Mg     1.9     07-31    TPro  8.4<H>  /  Alb  3.8  /  TBili  1.8<H>  /  DBili  x   /  AST  44<H>  /  ALT  26  /  AlkPhos  86  07-31    PT/INR - ( 31 Jul 2020 16:01 )   PT: 16.9 sec;   INR: 1.46          PTT - ( 31 Jul 2020 16:01 )  PTT:48.0 sec      RADIOLOGY & ADDITIONAL TESTS: Reviewed.

## 2020-08-01 NOTE — CONSULT NOTE ADULT - SUBJECTIVE AND OBJECTIVE BOX
Orthopaedic Surgery Consult Note    For Surgeon:    HPI:  86yMale  Patient is a 86y old  Male who presents with a chief complaint of RLE cellulitis (01 Aug 2020 11:43)    HPI:  86M w HIV (on Odefsey, VL UD, CD4 650), HLD, CAD s/p CABG (2014, on Eliquis), pacemaker for unspecified arrythmia (2016) with 2 prior ED visits (7/27, 7/30) for RLE cellulitis for which he was initially prescribed Clindamycin + 1g Vancomycin (7/27), then Keflex + 1g Vancomycin(7/30), returned to the ED for a 3rd visit complaining of progressively worsening erythema and RLE pain. Per pt, he sustained a R knee abrasion from a mechanical fall 5 days ago, washed it with hydrogen peroxide and treated it with topical bacitracin. He is able to ambulate full weight bearing  and is able to flex knee with mild discomfort. Denies fever, chills, nausea, vomiting, diarrhea, HA, numbness weakness, or tingling.     ED Vitals: Tmax 98.9 HR 80-90s -150/90 RR 18 SpO2 95-97% RA  ED Labs: WBC nml, H/H 18.4/53.6 Plt 129 Cr 1.38 (appears to be baseline)  LE Doppler: no DVT  ED Course: 1g Vancomycin, 1g Ceftriaxone (01 Aug 2020 00:31)      Allergies    Abacavir Sulfate (Flushing; Hives)  Bactrim (Short breath; Swelling)    Intolerances      PAST MEDICAL & SURGICAL HISTORY:  HIV (human immunodeficiency virus infection)  HLD (hyperlipidemia)  Pacemaker  Coronary artery disease involving native heart, angina presence unspecified, unspecified vessel or lesion type  H/O prior ablation treatment  Family history of coronary artery bypass graft surgery  History of pacemaker    MEDICATIONS  (STANDING):  ALPRAZolam 1 milliGRAM(s) Oral every 24 hours  apixaban 2.5 milliGRAM(s) Oral every 12 hours  atorvastatin 20 milliGRAM(s) Oral at bedtime  ceFAZolin   IVPB      ceFAZolin   IVPB 2000 milliGRAM(s) IV Intermittent every 8 hours  emtricitabine 200 mG/rilpivirine 25 mG/tenofovir alafenamide 25 mG (ODEFSEY) Tablet 1 Tablet(s) Oral every 24 hours  mirtazapine 15 milliGRAM(s) Oral every 24 hours  sucralfate 1 Gram(s) Oral <User Schedule>  valACYclovir 500 milliGRAM(s) Oral two times a day  vancomycin  IVPB 1000 milliGRAM(s) IV Intermittent every 12 hours    MEDICATIONS  (PRN):      Vital Signs Last 24 Hrs  T(C): 36.9 (01 Aug 2020 04:57), Max: 36.9 (31 Jul 2020 21:28)  T(F): 98.5 (01 Aug 2020 04:57), Max: 98.5 (31 Jul 2020 21:28)  HR: 74 (01 Aug 2020 04:57) (72 - 88)  BP: 113/64 (01 Aug 2020 04:57) (113/64 - 157/80)  BP(mean): --  RR: 16 (01 Aug 2020 04:57) (16 - 18)  SpO2: 97% (01 Aug 2020 04:57) (95% - 97%)    Physical Exam:                          17.6   6.28  )-----------( 113      ( 01 Aug 2020 08:28 )             51.9     08-01    137  |  100  |  16  ----------------------------<  94  4.2   |  24  |  1.17    Ca    9.0      01 Aug 2020 08:28  Mg     1.9     08-01    TPro  7.1  /  Alb  3.9  /  TBili  1.3<H>  /  DBili  x   /  AST  24  /  ALT  16  /  AlkPhos  71  08-01    PT/INR - ( 31 Jul 2020 16:01 )   PT: 16.9 sec;   INR: 1.46          PTT - ( 31 Jul 2020 16:01 )  PTT:48.0 sec  Imaging:     A/P: 86yMale    -Discussed with Dr. Xiong Pager [phone] Orthopaedic Surgery Consult Note    For Surgeon: Jasper      HPI:  86M w HIV (on Odefsey, VL UD, CD4 650), HLD, CAD s/p CABG (2014, on Eliquis), pacemaker for unspecified arrythmia (2016) with 2 prior ED visits (7/27, 7/30) for RLE cellulitis for which he was initially prescribed Clindamycin + 1g Vancomycin (7/27), then Keflex + 1g Vancomycin(7/30), returned to the ED for a 3rd visit complaining of progressively worsening erythema and RLE pain. Per pt, he sustained a R knee abrasion from a mechanical fall 5 days ago, washed it with hydrogen peroxide and treated it with topical bacitracin. He is able to ambulate full weight bearing  and is able to flex knee with mild discomfort. Denies fever, chills, nausea, vomiting, diarrhea, HA, numbness weakness, or tingling. Reports pain, swelling, erythema of leg has been getting worse since last discharge from ED.      ED Vitals: Tmax 98.9 HR 80-90s -150/90 RR 18 SpO2 95-97% RA  ED Labs: WBC nml, H/H 18.4/53.6 Plt 129 Cr 1.38 (appears to be baseline)  LE Doppler: no DVT  ED Course: 1g Vancomycin, 1g Ceftriaxone (01 Aug 2020 00:31)      Allergies    Abacavir Sulfate (Flushing; Hives)  Bactrim (Short breath; Swelling)    Intolerances      PAST MEDICAL & SURGICAL HISTORY:  HIV (human immunodeficiency virus infection)  HLD (hyperlipidemia)  Pacemaker  Coronary artery disease involving native heart, angina presence unspecified, unspecified vessel or lesion type  H/O prior ablation treatment  Family history of coronary artery bypass graft surgery  History of pacemaker    MEDICATIONS  (STANDING):  ALPRAZolam 1 milliGRAM(s) Oral every 24 hours  apixaban 2.5 milliGRAM(s) Oral every 12 hours  atorvastatin 20 milliGRAM(s) Oral at bedtime  ceFAZolin   IVPB      ceFAZolin   IVPB 2000 milliGRAM(s) IV Intermittent every 8 hours  emtricitabine 200 mG/rilpivirine 25 mG/tenofovir alafenamide 25 mG (ODEFSEY) Tablet 1 Tablet(s) Oral every 24 hours  mirtazapine 15 milliGRAM(s) Oral every 24 hours  sucralfate 1 Gram(s) Oral <User Schedule>  valACYclovir 500 milliGRAM(s) Oral two times a day  vancomycin  IVPB 1000 milliGRAM(s) IV Intermittent every 12 hours    MEDICATIONS  (PRN):      Vital Signs Last 24 Hrs  T(C): 36.9 (01 Aug 2020 04:57), Max: 36.9 (31 Jul 2020 21:28)  T(F): 98.5 (01 Aug 2020 04:57), Max: 98.5 (31 Jul 2020 21:28)  HR: 74 (01 Aug 2020 04:57) (72 - 88)  BP: 113/64 (01 Aug 2020 04:57) (113/64 - 157/80)  BP(mean): --  RR: 16 (01 Aug 2020 04:57) (16 - 18)  SpO2: 97% (01 Aug 2020 04:57) (95% - 97%)    Physical Exam:  General: Resting comfortably in bed. AAOx3. NAD.  Extremities:        LLE: No gross deformity. SILT L2-S1 distribution, symmetric. TA/EHL/FHL/GS motor intact. WWP, DP 2+       RLE: Grossly swollen RLE with cellulitis extending from just distal to knee to just proximal to ankle. Scab over anteromedial knee overlying large protuberance, fluctuant. No suprapatellar effusion. Skin overlying patella and knee joint appears largely spared from erythema. SILT L2-S1 distribution, symmetric. ROM knee 5-100 deg with pain with further flexion. TA/EHL/FHL/GS motor intact. WWP, DP 2+                          17.6   6.28  )-----------( 113      ( 01 Aug 2020 08:28 )             51.9     08-01    137  |  100  |  16  ----------------------------<  94  4.2   |  24  |  1.17    Ca    9.0      01 Aug 2020 08:28  Mg     1.9     08-01    TPro  7.1  /  Alb  3.9  /  TBili  1.3<H>  /  DBili  x   /  AST  24  /  ALT  16  /  AlkPhos  71  08-01    PT/INR - ( 31 Jul 2020 16:01 )   PT: 16.9 sec;   INR: 1.46          PTT - ( 31 Jul 2020 16:01 )  PTT:48.0 sec  Imaging: XR demonstrating no acute fx. CT knee showing fluid collection anterior to superior aspect of tibia just anterior to tibial plateau, walled off from tibia itself. Abscess vs hematoma given pt is eliquis, however in setting of diffuse cellulitis of RLE and elevated inflammatory markers, high suspicion for abscess. Local lidocaine given, knee prepped with chlorhexidine, I+D performed in sterile fashion. Lancing of wound yielded at least 30cc dark blood, likely hematoma from fall; no signs of purulence or infection. Pt reported significant improvement in pain with relief of pressure sensation in anterior knee after procedure. Knee cleaned again with betadine. Dressed with gauze, kerlix, ace.    A/P: 86yMale with R knee hematoma vs abscess after trauma to knee 6 days ago; I+D suggestive of hematoma, no evidence of infection. Wound swabs x 2 sent to lab  - Pain control  - Cont abx per primary  - F/u wound cx swabs  - Please trend ESR, CRP daily  - WBAT RLE  - Daily dressing change  - Ortho will follow  -Discussed with Dr. Hutchinson    Ortho Pager [phone] Orthopaedic Surgery Consult Note    For Surgeon: Jasper      HPI:  86M w HIV (on Odefsey, VL UD, CD4 650), HLD, CAD s/p CABG (2014, on Eliquis), pacemaker for unspecified arrythmia (2016) with 2 prior ED visits (7/27, 7/30) for RLE cellulitis for which he was initially prescribed Clindamycin + 1g Vancomycin (7/27), then Keflex + 1g Vancomycin(7/30), returned to the ED for a 3rd visit complaining of progressively worsening erythema and RLE pain. Per pt, he sustained a R knee abrasion from a mechanical fall 5 days ago, washed it with hydrogen peroxide and treated it with topical bacitracin. He is able to ambulate full weight bearing  and is able to flex knee with mild discomfort. Denies fever, chills, nausea, vomiting, diarrhea, HA, numbness weakness, or tingling. Reports pain, swelling, erythema of leg has been getting worse since last discharge from ED.      ED Vitals: Tmax 98.9 HR 80-90s -150/90 RR 18 SpO2 95-97% RA  ED Labs: WBC nml, H/H 18.4/53.6 Plt 129 Cr 1.38 (appears to be baseline)  LE Doppler: no DVT  ED Course: 1g Vancomycin, 1g Ceftriaxone (01 Aug 2020 00:31)      Allergies    Abacavir Sulfate (Flushing; Hives)  Bactrim (Short breath; Swelling)    Intolerances      PAST MEDICAL & SURGICAL HISTORY:  HIV (human immunodeficiency virus infection)  HLD (hyperlipidemia)  Pacemaker  Coronary artery disease involving native heart, angina presence unspecified, unspecified vessel or lesion type  H/O prior ablation treatment  Family history of coronary artery bypass graft surgery  History of pacemaker    MEDICATIONS  (STANDING):  ALPRAZolam 1 milliGRAM(s) Oral every 24 hours  apixaban 2.5 milliGRAM(s) Oral every 12 hours  atorvastatin 20 milliGRAM(s) Oral at bedtime  ceFAZolin   IVPB      ceFAZolin   IVPB 2000 milliGRAM(s) IV Intermittent every 8 hours  emtricitabine 200 mG/rilpivirine 25 mG/tenofovir alafenamide 25 mG (ODEFSEY) Tablet 1 Tablet(s) Oral every 24 hours  mirtazapine 15 milliGRAM(s) Oral every 24 hours  sucralfate 1 Gram(s) Oral <User Schedule>  valACYclovir 500 milliGRAM(s) Oral two times a day  vancomycin  IVPB 1000 milliGRAM(s) IV Intermittent every 12 hours    MEDICATIONS  (PRN):      Vital Signs Last 24 Hrs  T(C): 36.9 (01 Aug 2020 04:57), Max: 36.9 (31 Jul 2020 21:28)  T(F): 98.5 (01 Aug 2020 04:57), Max: 98.5 (31 Jul 2020 21:28)  HR: 74 (01 Aug 2020 04:57) (72 - 88)  BP: 113/64 (01 Aug 2020 04:57) (113/64 - 157/80)  BP(mean): --  RR: 16 (01 Aug 2020 04:57) (16 - 18)  SpO2: 97% (01 Aug 2020 04:57) (95% - 97%)    Physical Exam:  General: Resting comfortably in bed. AAOx3. NAD.  Extremities:        LLE: No gross deformity. SILT L2-S1 distribution, symmetric. TA/EHL/FHL/GS motor intact. WWP, DP 2+       RLE: Grossly swollen RLE with cellulitis extending from just distal to knee to just proximal to ankle. Scab over anteromedial knee overlying large protuberance, fluctuant. No suprapatellar effusion. Skin overlying patella and knee joint appears largely spared from erythema. SILT L2-S1 distribution, symmetric. ROM knee 5-100 deg with pain with further flexion. TA/EHL/FHL/GS motor intact. WWP, DP 2+                          17.6   6.28  )-----------( 113      ( 01 Aug 2020 08:28 )             51.9     08-01    137  |  100  |  16  ----------------------------<  94  4.2   |  24  |  1.17    Ca    9.0      01 Aug 2020 08:28  Mg     1.9     08-01    TPro  7.1  /  Alb  3.9  /  TBili  1.3<H>  /  DBili  x   /  AST  24  /  ALT  16  /  AlkPhos  71  08-01    PT/INR - ( 31 Jul 2020 16:01 )   PT: 16.9 sec;   INR: 1.46          PTT - ( 31 Jul 2020 16:01 )  PTT:48.0 sec  Imaging: XR demonstrating no acute fx. CT knee showing fluid collection anterior to superior aspect of tibia just anterior to tibial plateau, walled off from tibia itself, no communication to joint. Clinically no signs of septic arthritis- fluid collection is distal to and distinct from joint space. Superficial abscess vs hematoma given pt is eliquis, however in setting of diffuse cellulitis of RLE and elevated inflammatory markers, high suspicion for abscess. Local lidocaine given, anterior leg over fluctuant area prepped with chlorhexidine, I+D performed in sterile fashion. Lancing of wound yielded at least 30cc dark blood, likely hematoma from fall; no signs of purulence or infection. Probing and spreading w hemostat yielded no other fluid besides blood. Pt reported significant improvement in pain with relief of pressure sensation in anterior knee after procedure. Knee cleaned again with betadine. Dressed with gauze, kerlix, ace.    A/P: 86yMale with R superficial hematoma vs abscess anterior leg after trauma to knee 6 days ago; I+D suggestive of hematoma, no evidence of infection. Wound swabs x 2 sent to lab  - Pain control  - Cont abx per primary  - F/u wound cx swabs  - Please trend ESR, CRP daily  - WBAT RLE  - Daily dressing change  - Ortho will follow  -Discussed with Dr. Hutchinson    Ortho Pager [phone]

## 2020-08-01 NOTE — PROGRESS NOTE ADULT - PROBLEM SELECTOR PLAN 3
Compliant and well controlled on Odefsey (VLUD, CD4 650)  -c/w Odefsey 1 tablet q24h  -c/w Valtrex 500mg q12h

## 2020-08-02 LAB
ANION GAP SERPL CALC-SCNC: 10 MMOL/L — SIGNIFICANT CHANGE UP (ref 5–17)
BASOPHILS # BLD AUTO: 0.05 K/UL — SIGNIFICANT CHANGE UP (ref 0–0.2)
BASOPHILS NFR BLD AUTO: 0.6 % — SIGNIFICANT CHANGE UP (ref 0–2)
BUN SERPL-MCNC: 21 MG/DL — SIGNIFICANT CHANGE UP (ref 7–23)
CALCIUM SERPL-MCNC: 9.6 MG/DL — SIGNIFICANT CHANGE UP (ref 8.4–10.5)
CHLORIDE SERPL-SCNC: 97 MMOL/L — SIGNIFICANT CHANGE UP (ref 96–108)
CO2 SERPL-SCNC: 30 MMOL/L — SIGNIFICANT CHANGE UP (ref 22–31)
CREAT SERPL-MCNC: 1.66 MG/DL — HIGH (ref 0.5–1.3)
CRP SERPL-MCNC: 4.8 MG/DL — HIGH (ref 0–0.4)
CULTURE RESULTS: SIGNIFICANT CHANGE UP
CULTURE RESULTS: SIGNIFICANT CHANGE UP
EOSINOPHIL # BLD AUTO: 0.05 K/UL — SIGNIFICANT CHANGE UP (ref 0–0.5)
EOSINOPHIL NFR BLD AUTO: 0.6 % — SIGNIFICANT CHANGE UP (ref 0–6)
ERYTHROCYTE [SEDIMENTATION RATE] IN BLOOD: 15 MM/HR — SIGNIFICANT CHANGE UP
GLUCOSE SERPL-MCNC: 86 MG/DL — SIGNIFICANT CHANGE UP (ref 70–99)
GRAM STN FLD: SIGNIFICANT CHANGE UP
GRAM STN FLD: SIGNIFICANT CHANGE UP
HCT VFR BLD CALC: 52 % — HIGH (ref 39–50)
HGB BLD-MCNC: 17.4 G/DL — HIGH (ref 13–17)
IMM GRANULOCYTES NFR BLD AUTO: 0.4 % — SIGNIFICANT CHANGE UP (ref 0–1.5)
LYMPHOCYTES # BLD AUTO: 1.49 K/UL — SIGNIFICANT CHANGE UP (ref 1–3.3)
LYMPHOCYTES # BLD AUTO: 19.1 % — SIGNIFICANT CHANGE UP (ref 13–44)
MAGNESIUM SERPL-MCNC: 2.2 MG/DL — SIGNIFICANT CHANGE UP (ref 1.6–2.6)
MCHC RBC-ENTMCNC: 33.5 GM/DL — SIGNIFICANT CHANGE UP (ref 32–36)
MCHC RBC-ENTMCNC: 33.9 PG — SIGNIFICANT CHANGE UP (ref 27–34)
MCV RBC AUTO: 101.2 FL — HIGH (ref 80–100)
MONOCYTES # BLD AUTO: 1 K/UL — HIGH (ref 0–0.9)
MONOCYTES NFR BLD AUTO: 12.8 % — SIGNIFICANT CHANGE UP (ref 2–14)
NEUTROPHILS # BLD AUTO: 5.2 K/UL — SIGNIFICANT CHANGE UP (ref 1.8–7.4)
NEUTROPHILS NFR BLD AUTO: 66.5 % — SIGNIFICANT CHANGE UP (ref 43–77)
NRBC # BLD: 0 /100 WBCS — SIGNIFICANT CHANGE UP (ref 0–0)
PHOSPHATE SERPL-MCNC: 2.5 MG/DL — SIGNIFICANT CHANGE UP (ref 2.5–4.5)
PLATELET # BLD AUTO: 128 K/UL — LOW (ref 150–400)
POTASSIUM SERPL-MCNC: 5 MMOL/L — SIGNIFICANT CHANGE UP (ref 3.5–5.3)
POTASSIUM SERPL-SCNC: 5 MMOL/L — SIGNIFICANT CHANGE UP (ref 3.5–5.3)
RBC # BLD: 5.14 M/UL — SIGNIFICANT CHANGE UP (ref 4.2–5.8)
RBC # FLD: 14.8 % — HIGH (ref 10.3–14.5)
SODIUM SERPL-SCNC: 137 MMOL/L — SIGNIFICANT CHANGE UP (ref 135–145)
SPECIMEN SOURCE: SIGNIFICANT CHANGE UP
WBC # BLD: 7.82 K/UL — SIGNIFICANT CHANGE UP (ref 3.8–10.5)
WBC # FLD AUTO: 7.82 K/UL — SIGNIFICANT CHANGE UP (ref 3.8–10.5)

## 2020-08-02 PROCEDURE — 99233 SBSQ HOSP IP/OBS HIGH 50: CPT | Mod: GC

## 2020-08-02 RX ORDER — CEFTRIAXONE 500 MG/1
1000 INJECTION, POWDER, FOR SOLUTION INTRAMUSCULAR; INTRAVENOUS EVERY 24 HOURS
Refills: 0 | Status: DISCONTINUED | OUTPATIENT
Start: 2020-08-02 | End: 2020-08-03

## 2020-08-02 RX ADMIN — APIXABAN 2.5 MILLIGRAM(S): 2.5 TABLET, FILM COATED ORAL at 17:55

## 2020-08-02 RX ADMIN — MIRTAZAPINE 15 MILLIGRAM(S): 45 TABLET, ORALLY DISINTEGRATING ORAL at 21:23

## 2020-08-02 RX ADMIN — Medication 1 GRAM(S): at 06:30

## 2020-08-02 RX ADMIN — ATORVASTATIN CALCIUM 20 MILLIGRAM(S): 80 TABLET, FILM COATED ORAL at 21:23

## 2020-08-02 RX ADMIN — VALACYCLOVIR 500 MILLIGRAM(S): 500 TABLET, FILM COATED ORAL at 17:55

## 2020-08-02 RX ADMIN — CEFTRIAXONE 100 MILLIGRAM(S): 500 INJECTION, POWDER, FOR SOLUTION INTRAMUSCULAR; INTRAVENOUS at 13:47

## 2020-08-02 RX ADMIN — Medication 1 MILLIGRAM(S): at 21:23

## 2020-08-02 RX ADMIN — APIXABAN 2.5 MILLIGRAM(S): 2.5 TABLET, FILM COATED ORAL at 07:03

## 2020-08-02 RX ADMIN — VALACYCLOVIR 500 MILLIGRAM(S): 500 TABLET, FILM COATED ORAL at 06:30

## 2020-08-02 RX ADMIN — Medication 100 MILLIGRAM(S): at 06:31

## 2020-08-02 RX ADMIN — Medication 250 MILLIGRAM(S): at 06:31

## 2020-08-02 NOTE — PROGRESS NOTE ADULT - SUBJECTIVE AND OBJECTIVE BOX
Subjective: Pt doing well this morning, states pain relief of knee continued. Dressing changed this morning, wound cleansed with betadine. Leg erythema appears stable, still no suprapatellar effusion or apparent involvement of joint. Pt able to bear weight on R leg       Vital Signs Last 24 Hrs  T(C): 36.5 (02 Aug 2020 05:46), Max: 37.3 (01 Aug 2020 16:35)  T(F): 97.7 (02 Aug 2020 05:46), Max: 99.1 (01 Aug 2020 16:35)  HR: 90 (02 Aug 2020 05:46) (89 - 91)  BP: 155/94 (02 Aug 2020 05:46) (149/75 - 155/94)  RR: 18 (02 Aug 2020 05:46) (16 - 18)  SpO2: 94% (02 Aug 2020 05:46) (94% - 96%)      Physical Exam:  General: Resting comfortably in bed. AAOx3. NAD.  Extremities:        LLE: No gross deformity. SILT L2-S1 distribution, symmetric. TA/EHL/FHL/GS motor intact. WWP, DP 2+       RLE: Grossly swollen RLE with cellulitis extending from just distal to knee to just proximal to ankle. Scab over anteromedial knee overlying large protuberance, fluctuant. No suprapatellar effusion. Skin overlying patella and knee joint appears largely spared from erythema. SILT L2-S1 distribution, symmetric. ROM knee 5-100 deg with pain with further flexion. TA/EHL/FHL/GS motor intact. WWP, DP 2+      Labs:                      17.4   7.82  )-----------( 128      ( 02 Aug 2020 08:17 )             52.0     08-02    137  |  97  |  21  ----------------------------<  86  5.0   |  30  |  1.66<H>    Ca    9.6      02 Aug 2020 08:17  Phos  2.5     08-02  Mg     2.2     08-02    TPro  7.1  /  Alb  3.9  /  TBili  1.3<H>  /  DBili  x   /  AST  24  /  ALT  16  /  AlkPhos  71  08-01      A/P: 86yMale with R superficial hematoma anterior leg after trauma to knee 6 days ago; I+D suggestive of hematoma, no evidence of infection. On CT scan no involvement of joint or tibia. Wound swabs x 2 sent to lab. ESR downtrend 43 to 15; CRP 3.4 to 4.8  - Pain control  - Cont abx per primary  - F/u wound cx swabs  - Please trend ESR, CRP daily  - WBAT RLE  - Daily dressing change  - Ortho will follow  - Discussed with Dr. Hutchinson    Ortho Pager [phone]

## 2020-08-02 NOTE — PROGRESS NOTE ADULT - ASSESSMENT
86M w HIV (on Odefsey, VL UD, CD4 650), HLD, CAD s/p CABG (2014, on Eliquis), pacemaker for unspecified arrythmia (2016) with 2 prior ED visits (7/27, 7/30) for RLE cellulitis for which he was initially prescribed Clindamycin (7/27), then Keflex (7/30) plus 2 doses of IV Vanc, now admitted for management of RLE cellulitis and R-infrapatellar hematoma.     #RLE Cellulitis  -- no c/f infected joint or purulent cellulitis  -- f/u cultures from Ortho's I&D  -- narrowed to CTX today  -- if improved by tomorrow, discharge on Cefpodoxime   -- will need PMD f/u to assess resolution    Tiffanie Ruiz MD  Hospitalist Attending  [phone]
86M w HIV (on Odefsey, VL UD, CD4 650), HLD, CAD s/p CABG (2014, on Eliquis), pacemaker for unspecified arrythmia (2016) with 2 prior ED visits (7/27, 7/30) for RLE cellulitis for which he was initially prescribed Clindamycin (7/27), then Keflex (7/30) plus 2 doses of IV Vanc, now admitted for management of RLE cellulitis.

## 2020-08-02 NOTE — PROGRESS NOTE ADULT - SUBJECTIVE AND OBJECTIVE BOX
INTERVAL EVENTS:  -- s/p drainage of hematoma with Ortho last night    SUBJECTIVE:  -- states the pressure in his R-knee   -- denies fevers or chills  -- ambulating well even with the R knee dressing  -- thinks swelling is improving, as is erythema    MEDICATIONS:  MEDICATIONS  (STANDING):  ALPRAZolam 1 milliGRAM(s) Oral every 24 hours  apixaban 2.5 milliGRAM(s) Oral every 12 hours  atorvastatin 20 milliGRAM(s) Oral at bedtime  cefTRIAXone   IVPB 1000 milliGRAM(s) IV Intermittent every 24 hours  emtricitabine 200 mG/rilpivirine 25 mG/tenofovir alafenamide 25 mG (ODEFSEY) Tablet 1 Tablet(s) Oral every 24 hours  mirtazapine 15 milliGRAM(s) Oral every 24 hours  sucralfate 1 Gram(s) Oral <User Schedule>  valACYclovir 500 milliGRAM(s) Oral two times a day    MEDICATIONS  (PRN):    Allergies  Abacavir Sulfate (Flushing; Hives)  Bactrim (Short breath; Swelling)    OBJECTIVE:  Vital Signs Last 24 Hrs  T(C): 36.5 (02 Aug 2020 05:46), Max: 37.3 (01 Aug 2020 16:35)  T(F): 97.7 (02 Aug 2020 05:46), Max: 99.1 (01 Aug 2020 16:35)  HR: 90 (02 Aug 2020 05:46) (89 - 91)  BP: 155/94 (02 Aug 2020 05:46) (149/75 - 155/94)  BP(mean): --  RR: 18 (02 Aug 2020 05:46) (16 - 18)  SpO2: 94% (02 Aug 2020 05:46) (94% - 96%)  I&O's Summary    PHYSICAL EXAM:  Gen: NAD, sitting upright in bed  HEENT: NCAT, MMM, clear OP  CV: RRR, no m/g/r appreciated  Pulm: CTA B, no w/r/r; no increase in WOB  Abd: normoactive BS, soft, NTND  Ext: WWP, RLE slightly greater than LLE  Skin: R knee wrapped with underlying non-blanching erythema and slight warmth, receding from area of demarcation   Neuro: AOx3, nonfocal  Psych: pleasant, conversant and appropriate    LABS:                        17.4   7.82  )-----------( 128      ( 02 Aug 2020 08:17 )             52.0     08-02    137  |  97  |  21  ----------------------------<  86  5.0   |  30  |  1.66<H>    Ca    9.6      02 Aug 2020 08:17  Phos  2.5     08-02  Mg     2.2     08-02    TPro  7.1  /  Alb  3.9  /  TBili  1.3<H>  /  DBili  x   /  AST  24  /  ALT  16  /  AlkPhos  71  08-01    MICRODATA:  Culture - Blood (collected 31 Jul 2020 23:55)  Source: .Blood Blood-Venous  Preliminary Report (02 Aug 2020 01:02):    No growth to date.    Culture - Blood (collected 31 Jul 2020 23:55)  Source: .Blood Blood-Venous  Preliminary Report (02 Aug 2020 01:02):    No growth to date.    RADIOLOGY/OTHER STUDIES:  -- No new imaging.

## 2020-08-02 NOTE — CHART NOTE - NSCHARTNOTEFT_GEN_A_CORE
POST FALL ASSESSMENT    Primary  Notified  [ x] Yes:  Name of  spoken to:  [   Kole Nova, self  (did not want primary contact to be contacted   ]   [ ] No:  If no, explain [                                                             ]     Attending Notified (Required):   [ x] Yes.  Name of Physician: [              Dr Vesna Sol                             ]   [ ] No.   If no, explain:  [                                                    ]     Physical Exam:    BP:  [   167      / 90      ]          Pulse:  [  94       ]               Pulse Ox:  [   95%]     RR:  [      18    ]                 Temperature:  [    99.7         ]     Findings:  [                                                      ]     Imaging Ordered STAT:  [  x] CT Head    [  ] X-ray:  [                               ]       [  ] Other:  [                      ]    [  ] None    Medication Review  [  ] Anesthetic                                                   [  ] Digoxin                                                           [  ] Antidepressant  [  ] Diuretic                                                        [  ] Antihistamine                                                [  ] Hypoglycemic  [  ] Anti-hypertensive                                      [  ] Narcotic                                                          [  ] Anti-seizure  [  ] NSAID                                                           [  x ] Anticoagulant - eliquis 2.5 bid                                           [  ] Psychotropic  [  ] Antiplatelet (e.g. aspirin, clopidogrel)    [  ] Sedative/Hypnotic                                        [  x] Benzodiazepine - ativan 1mg q24   [  ] Cathartic / Laxative                                    [  ] Patient currently on 6 or more meds    Adjustment Made to Medication:  [   ] Yes   [x  ] No       If yes, please explain [                                                                    ]                     Imaging results reviewed:  Date/Time:  [         pending                                ]      Findings:  [  ] Negative  [  ]  Positive        If findings:  Explain (and enter progress note):  [            pending                                   ]

## 2020-08-03 ENCOUNTER — TRANSCRIPTION ENCOUNTER (OUTPATIENT)
Age: 85
End: 2020-08-03

## 2020-08-03 VITALS
OXYGEN SATURATION: 95 % | RESPIRATION RATE: 18 BRPM | SYSTOLIC BLOOD PRESSURE: 129 MMHG | TEMPERATURE: 101 F | HEART RATE: 96 BPM | DIASTOLIC BLOOD PRESSURE: 64 MMHG

## 2020-08-03 LAB
ANION GAP SERPL CALC-SCNC: 11 MMOL/L — SIGNIFICANT CHANGE UP (ref 5–17)
BASOPHILS # BLD AUTO: 0.06 K/UL — SIGNIFICANT CHANGE UP (ref 0–0.2)
BASOPHILS NFR BLD AUTO: 0.9 % — SIGNIFICANT CHANGE UP (ref 0–2)
BUN SERPL-MCNC: 20 MG/DL — SIGNIFICANT CHANGE UP (ref 7–23)
CALCIUM SERPL-MCNC: 9 MG/DL — SIGNIFICANT CHANGE UP (ref 8.4–10.5)
CHLORIDE SERPL-SCNC: 98 MMOL/L — SIGNIFICANT CHANGE UP (ref 96–108)
CO2 SERPL-SCNC: 25 MMOL/L — SIGNIFICANT CHANGE UP (ref 22–31)
CREAT SERPL-MCNC: 1.16 MG/DL — SIGNIFICANT CHANGE UP (ref 0.5–1.3)
CRP SERPL-MCNC: 7.83 MG/DL — HIGH (ref 0–0.4)
EOSINOPHIL # BLD AUTO: 0.04 K/UL — SIGNIFICANT CHANGE UP (ref 0–0.5)
EOSINOPHIL NFR BLD AUTO: 0.6 % — SIGNIFICANT CHANGE UP (ref 0–6)
ERYTHROCYTE [SEDIMENTATION RATE] IN BLOOD: 10 MM/HR — SIGNIFICANT CHANGE UP
GLUCOSE SERPL-MCNC: 107 MG/DL — HIGH (ref 70–99)
HCT VFR BLD CALC: 52.6 % — HIGH (ref 39–50)
HGB BLD-MCNC: 17.6 G/DL — HIGH (ref 13–17)
IMM GRANULOCYTES NFR BLD AUTO: 0.4 % — SIGNIFICANT CHANGE UP (ref 0–1.5)
LYMPHOCYTES # BLD AUTO: 1.39 K/UL — SIGNIFICANT CHANGE UP (ref 1–3.3)
LYMPHOCYTES # BLD AUTO: 20 % — SIGNIFICANT CHANGE UP (ref 13–44)
MAGNESIUM SERPL-MCNC: 2.1 MG/DL — SIGNIFICANT CHANGE UP (ref 1.6–2.6)
MCHC RBC-ENTMCNC: 33.5 GM/DL — SIGNIFICANT CHANGE UP (ref 32–36)
MCHC RBC-ENTMCNC: 33.7 PG — SIGNIFICANT CHANGE UP (ref 27–34)
MCV RBC AUTO: 100.6 FL — HIGH (ref 80–100)
MONOCYTES # BLD AUTO: 0.82 K/UL — SIGNIFICANT CHANGE UP (ref 0–0.9)
MONOCYTES NFR BLD AUTO: 11.8 % — SIGNIFICANT CHANGE UP (ref 2–14)
NEUTROPHILS # BLD AUTO: 4.62 K/UL — SIGNIFICANT CHANGE UP (ref 1.8–7.4)
NEUTROPHILS NFR BLD AUTO: 66.3 % — SIGNIFICANT CHANGE UP (ref 43–77)
NRBC # BLD: 0 /100 WBCS — SIGNIFICANT CHANGE UP (ref 0–0)
PHOSPHATE SERPL-MCNC: 2.5 MG/DL — SIGNIFICANT CHANGE UP (ref 2.5–4.5)
PLATELET # BLD AUTO: 127 K/UL — LOW (ref 150–400)
POTASSIUM SERPL-MCNC: 4.2 MMOL/L — SIGNIFICANT CHANGE UP (ref 3.5–5.3)
POTASSIUM SERPL-SCNC: 4.2 MMOL/L — SIGNIFICANT CHANGE UP (ref 3.5–5.3)
RBC # BLD: 5.23 M/UL — SIGNIFICANT CHANGE UP (ref 4.2–5.8)
RBC # FLD: 14.6 % — HIGH (ref 10.3–14.5)
SODIUM SERPL-SCNC: 134 MMOL/L — LOW (ref 135–145)
WBC # BLD: 6.96 K/UL — SIGNIFICANT CHANGE UP (ref 3.8–10.5)
WBC # FLD AUTO: 6.96 K/UL — SIGNIFICANT CHANGE UP (ref 3.8–10.5)

## 2020-08-03 PROCEDURE — 80048 BASIC METABOLIC PNL TOTAL CA: CPT

## 2020-08-03 PROCEDURE — 87040 BLOOD CULTURE FOR BACTERIA: CPT

## 2020-08-03 PROCEDURE — 83735 ASSAY OF MAGNESIUM: CPT

## 2020-08-03 PROCEDURE — 87075 CULTR BACTERIA EXCEPT BLOOD: CPT

## 2020-08-03 PROCEDURE — 87635 SARS-COV-2 COVID-19 AMP PRB: CPT

## 2020-08-03 PROCEDURE — 85652 RBC SED RATE AUTOMATED: CPT

## 2020-08-03 PROCEDURE — 86140 C-REACTIVE PROTEIN: CPT

## 2020-08-03 PROCEDURE — 99285 EMERGENCY DEPT VISIT HI MDM: CPT | Mod: 25

## 2020-08-03 PROCEDURE — 85730 THROMBOPLASTIN TIME PARTIAL: CPT

## 2020-08-03 PROCEDURE — 96375 TX/PRO/DX INJ NEW DRUG ADDON: CPT

## 2020-08-03 PROCEDURE — 80053 COMPREHEN METABOLIC PANEL: CPT

## 2020-08-03 PROCEDURE — 87070 CULTURE OTHR SPECIMN AEROBIC: CPT

## 2020-08-03 PROCEDURE — 70450 CT HEAD/BRAIN W/O DYE: CPT | Mod: 26

## 2020-08-03 PROCEDURE — 96374 THER/PROPH/DIAG INJ IV PUSH: CPT

## 2020-08-03 PROCEDURE — 73590 X-RAY EXAM OF LOWER LEG: CPT

## 2020-08-03 PROCEDURE — 85610 PROTHROMBIN TIME: CPT

## 2020-08-03 PROCEDURE — 73701 CT LOWER EXTREMITY W/DYE: CPT

## 2020-08-03 PROCEDURE — 82550 ASSAY OF CK (CPK): CPT

## 2020-08-03 PROCEDURE — 83605 ASSAY OF LACTIC ACID: CPT

## 2020-08-03 PROCEDURE — 36415 COLL VENOUS BLD VENIPUNCTURE: CPT

## 2020-08-03 PROCEDURE — 70450 CT HEAD/BRAIN W/O DYE: CPT

## 2020-08-03 PROCEDURE — 84100 ASSAY OF PHOSPHORUS: CPT

## 2020-08-03 PROCEDURE — 85025 COMPLETE CBC W/AUTO DIFF WBC: CPT

## 2020-08-03 PROCEDURE — 99239 HOSP IP/OBS DSCHRG MGMT >30: CPT

## 2020-08-03 PROCEDURE — 73562 X-RAY EXAM OF KNEE 3: CPT

## 2020-08-03 RX ORDER — CEFPODOXIME PROXETIL 100 MG
2 TABLET ORAL
Qty: 32 | Refills: 0
Start: 2020-08-03 | End: 2020-08-10

## 2020-08-03 RX ORDER — CEFPODOXIME PROXETIL 100 MG
2 TABLET ORAL
Qty: 0 | Refills: 0 | DISCHARGE
Start: 2020-08-03

## 2020-08-03 RX ORDER — CEFPODOXIME PROXETIL 100 MG
400 TABLET ORAL
Refills: 0 | Status: DISCONTINUED | OUTPATIENT
Start: 2020-08-03 | End: 2020-08-03

## 2020-08-03 RX ORDER — ACETAMINOPHEN 500 MG
650 TABLET ORAL ONCE
Refills: 0 | Status: COMPLETED | OUTPATIENT
Start: 2020-08-03 | End: 2020-08-03

## 2020-08-03 RX ADMIN — VALACYCLOVIR 500 MILLIGRAM(S): 500 TABLET, FILM COATED ORAL at 06:35

## 2020-08-03 RX ADMIN — APIXABAN 2.5 MILLIGRAM(S): 2.5 TABLET, FILM COATED ORAL at 06:35

## 2020-08-03 RX ADMIN — Medication 400 MILLIGRAM(S): at 11:03

## 2020-08-03 RX ADMIN — Medication 1 GRAM(S): at 06:35

## 2020-08-03 RX ADMIN — EMTRICITABINE, RILPIVIRINE HYDROCHLORIDE, AND TENOFOVIR DISOPROXIL FUMARATE 1 TABLET(S): 200; 25; 300 TABLET, FILM COATED ORAL at 10:11

## 2020-08-03 RX ADMIN — Medication 650 MILLIGRAM(S): at 16:51

## 2020-08-03 NOTE — DISCHARGE NOTE PROVIDER - CARE PROVIDER_API CALL
Daniel Yung  INTERNAL MEDICINE  [address] [address]  Phone: [phone]  Fax: [phone]  Follow Up Time: 1 month    Luis Enrique Dickey  [address] [place] [address]  [address] [place] [address]  Phone: [phone]  Fax: (   )    -  Established Patient  Follow Up Time: 2 weeks

## 2020-08-03 NOTE — DISCHARGE NOTE PROVIDER - HOSPITAL COURSE
#Discharge: do not delete        Patient is 87 yo M with past medical history of HIV (Odefsey, VL UD. CD4 650), HLD, CAD s/p CABG, pacemaker for arrhythmia, and 2 prior ED visits for RLE cellulitis    Presented with RLE cellulitis refractory to Keflex and Clindamycin outpatient found to have RLE cellulitis with infrapatellar bursa hematoma that is now s/p I/D with orthopedics.         Problem List/Main Diagnoses (system-based):     #RLE Cellulitis: Pt presenting with worsening RLE cellulitis refractory antibiotic treatment (clindamycin, keflex) + Vancomycin (while in the ED). Pt with HIV currently well controlled on Odefsey (VLUD, CD4 650) however vein used for CABG was harvested from affected leg. Was started on Zosyn and Ancef  in the ED, and then was changed to CTX with cultures being negative. Patient has not been febrile on this admission and is now safe and stable for home discharge.         #Polycythemia: Pt has been polycythemic on this admission to 17-18 HGB. Is not dry on exam. Unknown origin requires outpatient follow up with Hematology.        #HIV: Currently viral load undetectable and CD4 650. Should continue on Odefsy q24 outpatient.             Inpatient treatment course:  Pt was admitted for cellulitis that is recurrent and refractory on the RLE was previously placed on keflex and clindamycin and one time doses of vancomycin while inpatient. On this admission was started on vancomycin and ancef and then narrowed to CTX. Now being discharged on Cefpodoxime. Also received I/D with orthopedics, found to have a hematoma and cultures were sent that have no growth. The cellulitis is not worsening and the patient has been afebrile on this admission and is safe and stable to discharge. Stay was c/b a mechanical fall on Eliquis, however pt reports no dizziness, LOC, and CTH showed no acute bleeds as well as no neurological changes.         New medications: Cefpodoxime 400 BID for total 8 days.     Labs to be followed outpatient: None    Exam to be followed outpatient: Follow up with your PCP in 2 weeks. Also with Heme/Onc in 1 month.

## 2020-08-03 NOTE — DISCHARGE NOTE NURSING/CASE MANAGEMENT/SOCIAL WORK - PATIENT PORTAL LINK FT
You can access the FollowMyHealth Patient Portal offered by [facility] by registering at the following website: [email or web address]. By joining [email or web address]’s FollowMyHealth portal, you will also be able to view your health information using other applications (apps) compatible with our system.

## 2020-08-03 NOTE — DISCHARGE NOTE PROVIDER - PROVIDER TOKENS
PROVIDER:[TOKEN:[[id number]],FOLLOWUP:[1 month]],FREE:[LAST:[Noble],FIRST:[Luis Enrique],PHONE:[phone]],FAX:[(   )    -],ADDRESS:[[address] [place] [address]],FOLLOWUP:[2 weeks],ESTABLISHEDPATIENT:[T]]

## 2020-08-03 NOTE — PROGRESS NOTE ADULT - SUBJECTIVE AND OBJECTIVE BOX
Patient seen and examined.  86 year old male  Notes fell approximately 1 week ago  Treated in ED and given oral abx for cellulitis  Worsened and another oral abx prescribed  Admitted to [facility]  Friday when right leg did not improve    Orthopedic resident bedside I&D with gram stain/culture right knee; no growth to date    Affected extremity:          Right knee wrapped in aced bandage  minimal pain anterior knee with palpation    Right calf mild swelling  erythema proximal and middle tibia region  negative Isaac's  no pain with PROM toes    LABS:                        17.6   6.96  )-----------( 127      ( 03 Aug 2020 06:23 )             52.6     08-03    134<L>  |  98  |  20  ----------------------------<  107<H>  4.2   |  25  |  1.16    Ca    9.0      03 Aug 2020 06:23  Phos  2.5     08-03  Mg     2.1     08-03    8.2.20  ESR 15  CRP 4.80    8.3.20  WBC 6.96    Antibiotics:   cefpodoxime 400 milliGRAM(s) Oral two times a day  emtricitabine 200 mG/rilpivirine 25 mG/tenofovir alafenamide 25 mG (ODEFSEY) Tablet 1 Tablet(s) Oral every 24 hours  valACYclovir 500 milliGRAM(s) Oral two times a day      A/P :   87 yo male  right LE cellulitis with hemorrhagic bursitis r/o abscess  IV abx per medicine  follow culture results of I & D sample  follow trend ESR, CRP  WBAT  will discuss with Dr. Jasper landa following

## 2020-08-03 NOTE — DISCHARGE NOTE PROVIDER - NSDCFUADDAPPT_GEN_ALL_CORE_FT
Please follow up with Dr. Dudley in 2 weeks by calling the office and making an appointment.   Luis Enrique Dickey  [address] [facility]  [address] [place] [address]  Phone: [phone]    Please make an appointment for Dr. Yung in the next 1 month to follow up on your polycythemia. If you would like to see someone else please have Dr. Dudley recommend someone to you.   Daniel Yung  INTERNAL MEDICINE  [address], [place] [address]  Phone: [phone]  Fax: [phone]

## 2020-08-03 NOTE — DISCHARGE NOTE PROVIDER - NSDCMRMEDTOKEN_GEN_ALL_CORE_FT
atorvastatin 20 mg oral tablet: 1 tab(s) orally once a day  Eliquis: 2.5 tab(s) orally every 12 hours  mirtazapine 15 mg oral tablet: 1 tab(s) orally once a day (at bedtime)  Odefsey oral tablet: 1 tab(s) orally once a day  sucralfate 1 g oral tablet:   Valtrex:   Xanax 1 mg oral tablet: atorvastatin 20 mg oral tablet: 1 tab(s) orally once a day  cefpodoxime 200 mg oral tablet: 2 tab(s) orally 2 times a day. Take 1 dose tonight, and then 2 doses everyday after today.  Eliquis: 2.5 tab(s) orally every 12 hours  mirtazapine 15 mg oral tablet: 1 tab(s) orally once a day (at bedtime)  Odefsey oral tablet: 1 tab(s) orally once a day  sucralfate 1 g oral tablet:   Valtrex:   Xanax 1 mg oral tablet:

## 2020-08-03 NOTE — DISCHARGE NOTE PROVIDER - NSDCCAREPROVSEEN_GEN_ALL_CORE_FT
CERTIFICATE OF RETURN TO WORK/SCHOOL    January 10, 2020      Re:   Giuliana Tabares  [address]   [address]  [facility] [address]                        This is to certify that Giuliana Tabares was seen on 1/10/2020. Please excuse work or school for 1/10/20.           SIGNATURE:___________________________________________,   1/10/2020      MD Lindsay Brumfield MD  [facility]  [address] [facility] [address]  Dept Phone: [phone]  
Tiffanie Ruiz

## 2020-08-03 NOTE — DISCHARGE NOTE PROVIDER - NSDCCPCAREPLAN_GEN_ALL_CORE_FT
PRINCIPAL DISCHARGE DIAGNOSIS  Diagnosis: Cellulitis  Assessment and Plan of Treatment: On this admission you had a skin infection called cellulitis that did not resolve with previous antibiotic treatment in the past. In the hospital we gave you some IV antibiotics and then have changed to Cefpdoxime 400 two times a day for you to continue taking for 8 more days once you leave the hospital. We have the orthopedic doctors look  at your infection as well and they do not believe that you have any infection in your joint. They drained a little bit of blood from the knee that had been buildt up there. Overall you are improving and should continue to take your medication as prescribed. Please return to the hospital if you have fevers, chills, night sweats, or worsening redness, swelling, or pain the your R leg. Please follow up with your primary doctor in 1-2 weeks.      SECONDARY DISCHARGE DIAGNOSES  Diagnosis: HIV (human immunodeficiency virus infection)  Assessment and Plan of Treatment: You have HIV. You have been well controlled on Odefsy outpatient. Please continue taking this medication, you were continued on it in the hospital.    Diagnosis: Polycythemia  Assessment and Plan of Treatment: You have polcythemia. People with polycythemia make too many red blood cells (the cells that carry oxygen to your body). In the hospital your hemoglobin level (the level we check to see how your red blood cells are functioning) was 17-18, which is high. We reccomend you follow up with a hematology doctor like Dr. Yung or another hematology doctor that your primary care doctor can refer you too. You should see them within a month from leaving from the hospital.

## 2020-08-03 NOTE — DISCHARGE NOTE NURSING/CASE MANAGEMENT/SOCIAL WORK - NSDCFUADDAPPT_GEN_ALL_CORE_FT
Please follow up with Dr. Dudley in 2 weeks by calling the office and making an appointment.   Luis Enrique Dickey  [address] [facility]  [address] [place] [address]  Phone: [phone]    Please make an appointment for Dr. Yung in the next 1 month to follow up on your polycythemia. If you would like to see someone else please have Dr. Dudley recommend someone to you.   Daniel Yung  INTERNAL MEDICINE  [address] [address]  Phone: [phone]  Fax: [phone]

## 2020-08-03 NOTE — PHYSICAL THERAPY INITIAL EVALUATION ADULT - IMPAIRMENTS CONTRIBUTING IMPAIRED BED MOBILITY, REHAB EVAL
bilateral UE shaking noted during transition of movement, per patient this is chronic and happens only when he tries to get up from supine

## 2020-08-03 NOTE — PROGRESS NOTE ADULT - SUBJECTIVE AND OBJECTIVE BOX
Subjective: Pt doing well this morning, states pain relief of knee continued. Dressing changed this morning, wound cleansed with chlorhexidine. Leg erythema appears stable, still no suprapatellar effusion or apparent involvement of joint. Pt able to bear weight on R leg however fell yesterday, but feels well      Vital Signs Last 24 Hrs  T(C): 36.8 (03 Aug 2020 10:26), Max: 37.9 (02 Aug 2020 21:22)  T(F): 98.2 (03 Aug 2020 10:26), Max: 100.3 (02 Aug 2020 21:52)  HR: 85 (03 Aug 2020 10:26) (80 - 99)  BP: 120/72 (03 Aug 2020 10:26) (120/72 - 167/90)  BP(mean): --  RR: 18 (03 Aug 2020 10:26) (16 - 18)  SpO2: 95% (03 Aug 2020 10:26) (95% - 97%)      Physical Exam:  General: Resting comfortably in bed. AAOx3. NAD.  Extremities:        LLE: No gross deformity. SILT L2-S1 distribution, symmetric. TA/EHL/FHL/GS motor intact. WWP, DP 2+       RLE: Grossly swollen RLE with cellulitis extending from just distal to knee to just proximal to ankle. Scab over anteromedial knee overlying large protuberance, fluctuant. No suprapatellar effusion. Skin overlying patella and knee joint appears largely spared from erythema. SILT L2-S1 distribution, symmetric. ROM knee 5-100 deg with pain with further flexion. TA/EHL/FHL/GS motor intact. WWP, DP 2+      Labs:                                   17.6   6.96  )-----------( 127      ( 03 Aug 2020 06:23 )             52.6   08-03    134<L>  |  98  |  20  ----------------------------<  107<H>  4.2   |  25  |  1.16    Ca    9.0      03 Aug 2020 06:23  Phos  2.5     08-03  Mg     2.1     08-03          A/P: 86yMale with R superficial hematoma anterior leg after trauma to knee 6 days ago; I+D suggestive of hematoma, no evidence of infection. On CT scan no involvement of joint or tibia. Wound swabs x 2 sent to lab, so far negative  - Pain control  - Cont abx per primary  - F/u wound cx swabs-- so far negative growth  - Blood cultures continue negative  - Please trend ESR, CRP daily  - WBAT RLE  - Daily dressing change  - Ortho will follow  - Discussed with Dr. Hutchinson    Ortho Pager [phone]

## 2020-08-03 NOTE — PHYSICAL THERAPY INITIAL EVALUATION ADULT - LEVEL OF INDEPENDENCE: GAIT, REHAB EVAL
100 feet with Hand Held Assist with contact guard; 100 feet with SC with contact guard, fairly steady, no loss of balance, slight antalgic gait, no c/o increase in right LE pain

## 2020-08-11 DIAGNOSIS — L03.90 CELLULITIS, UNSPECIFIED: ICD-10-CM

## 2020-08-11 DIAGNOSIS — I25.10 ATHEROSCLEROTIC HEART DISEASE OF NATIVE CORONARY ARTERY WITHOUT ANGINA PECTORIS: ICD-10-CM

## 2020-08-11 DIAGNOSIS — M70.51 OTHER BURSITIS OF KNEE, RIGHT KNEE: ICD-10-CM

## 2020-08-11 DIAGNOSIS — S80.01XA CONTUSION OF RIGHT KNEE, INITIAL ENCOUNTER: ICD-10-CM

## 2020-08-11 DIAGNOSIS — Z95.5 PRESENCE OF CORONARY ANGIOPLASTY IMPLANT AND GRAFT: ICD-10-CM

## 2020-08-11 DIAGNOSIS — E87.5 HYPERKALEMIA: ICD-10-CM

## 2020-08-11 DIAGNOSIS — M71.561 OTHER BURSITIS, NOT ELSEWHERE CLASSIFIED, RIGHT KNEE: ICD-10-CM

## 2020-08-11 DIAGNOSIS — E78.5 HYPERLIPIDEMIA, UNSPECIFIED: ICD-10-CM

## 2020-08-11 DIAGNOSIS — L03.115 CELLULITIS OF RIGHT LOWER LIMB: ICD-10-CM

## 2020-08-11 DIAGNOSIS — Z21 ASYMPTOMATIC HUMAN IMMUNODEFICIENCY VIRUS [HIV] INFECTION STATUS: ICD-10-CM
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
## 2020-08-11 DIAGNOSIS — F32.9 MAJOR DEPRESSIVE DISORDER, SINGLE EPISODE, UNSPECIFIED: ICD-10-CM

## 2020-08-11 DIAGNOSIS — Z95.0 PRESENCE OF CARDIAC PACEMAKER: ICD-10-CM

## 2020-08-11 DIAGNOSIS — D75.1 SECONDARY POLYCYTHEMIA: ICD-10-CM

## 2020-08-24 LAB
CULTURE RESULTS: NO GROWTH — SIGNIFICANT CHANGE UP
CULTURE RESULTS: NO GROWTH — SIGNIFICANT CHANGE UP
SPECIMEN SOURCE: SIGNIFICANT CHANGE UP
SPECIMEN SOURCE: SIGNIFICANT CHANGE UP

## 2021-01-01 NOTE — ED ADULT TRIAGE NOTE - NS ED NURSE DIRECT TO ROOM YN
DISPLAY PLAN FREE TEXT DISPLAY PLAN FREE TEXT DISPLAY PLAN FREE TEXT DISPLAY PLAN FREE TEXT DISPLAY PLAN FREE TEXT DISPLAY PLAN FREE TEXT DISPLAY PLAN FREE TEXT DISPLAY PLAN FREE TEXT DISPLAY PLAN FREE TEXT DISPLAY PLAN FREE TEXT Yes DISPLAY PLAN FREE TEXT DISPLAY PLAN FREE TEXT DISPLAY PLAN FREE TEXT

## 2021-01-17 ENCOUNTER — EMERGENCY (EMERGENCY)
Facility: HOSPITAL | Age: 86
LOS: 1 days | Discharge: HOME | End: 2021-01-17
Attending: EMERGENCY MEDICINE | Admitting: EMERGENCY MEDICINE
Payer: MEDICARE

## 2021-01-17 VITALS
TEMPERATURE: 98 F | SYSTOLIC BLOOD PRESSURE: 162 MMHG | OXYGEN SATURATION: 95 % | WEIGHT: 143.96 LBS | RESPIRATION RATE: 18 BRPM | HEART RATE: 71 BPM | DIASTOLIC BLOOD PRESSURE: 82 MMHG | HEIGHT: 64 IN
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
## 2021-01-17 DIAGNOSIS — Z82.49 FAMILY HISTORY OF ISCHEMIC HEART DISEASE AND OTHER DISEASES OF THE CIRCULATORY SYSTEM: Chronic | ICD-10-CM
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
## 2021-01-17 DIAGNOSIS — T16.1XXA FOREIGN BODY IN RIGHT EAR, INITIAL ENCOUNTER: ICD-10-CM
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
## 2021-01-17 DIAGNOSIS — Z95.0 PRESENCE OF CARDIAC PACEMAKER: Chronic | ICD-10-CM

## 2021-01-17 DIAGNOSIS — Z98.890 OTHER SPECIFIED POSTPROCEDURAL STATES: Chronic | ICD-10-CM

## 2021-01-17 PROCEDURE — 99282 EMERGENCY DEPT VISIT SF MDM: CPT

## 2021-01-17 NOTE — ED ADULT TRIAGE NOTE - CHIEF COMPLAINT QUOTE
c/o hearing aid stuck in his right ear. states "I don't know how it got pushed in so far. someone has to pull the wire out with a tweezer."

## 2021-01-17 NOTE — ED PROVIDER NOTE - ENMT, MLM
BL hearing aids in place.  No surrounding redness, erythema or purulence.  TM nl beyond R hearing aid s/p removal.

## 2021-01-17 NOTE — ED PROVIDER NOTE - PATIENT PORTAL LINK FT
You can access the FollowMyHealth Patient Portal offered by [facility] by registering at the following website: [email or web address]. By joining [employer]’s FollowMyHealth portal, you will also be able to view your health information using other applications (apps) compatible with our system.

## 2021-02-01 NOTE — ED ADULT NURSE NOTE - CADM POA URETHRAL CATHETER
21        Sathish Hylton   [address] [address]          Patient:  Sathish JONES Concetta   : 1938      Dear Sathish:    We received your message stating you wanted to provide an update on your Parkinson's disease. We have been unable to reach you by phone. Please contact the office at your earliest convenience and we would be happy to discuss this with you further.    Sincerely,          VOLODYMYR Farr  [address] [place] [address]  Phone: [phone]  
No

## 2021-02-13 ENCOUNTER — EMERGENCY (EMERGENCY)
Facility: HOSPITAL | Age: 86
LOS: 1 days | Discharge: HOME | End: 2021-02-13
Admitting: EMERGENCY MEDICINE
Payer: MEDICARE

## 2021-02-13 VITALS
DIASTOLIC BLOOD PRESSURE: 84 MMHG | WEIGHT: 139.99 LBS | HEART RATE: 69 BPM | OXYGEN SATURATION: 95 % | TEMPERATURE: 98 F | HEIGHT: 64 IN | RESPIRATION RATE: 26 BRPM | SYSTOLIC BLOOD PRESSURE: 163 MMHG

## 2021-02-13 VITALS
HEART RATE: 71 BPM | SYSTOLIC BLOOD PRESSURE: 175 MMHG | RESPIRATION RATE: 18 BRPM | DIASTOLIC BLOOD PRESSURE: 80 MMHG | OXYGEN SATURATION: 97 % | TEMPERATURE: 98 F

## 2021-02-13 DIAGNOSIS — R42 DIZZINESS AND GIDDINESS: ICD-10-CM

## 2021-02-13 DIAGNOSIS — Z82.49 FAMILY HISTORY OF ISCHEMIC HEART DISEASE AND OTHER DISEASES OF THE CIRCULATORY SYSTEM: Chronic | ICD-10-CM

## 2021-02-13 DIAGNOSIS — Z98.890 OTHER SPECIFIED POSTPROCEDURAL STATES: Chronic | ICD-10-CM

## 2021-02-13 DIAGNOSIS — Z95.0 PRESENCE OF CARDIAC PACEMAKER: Chronic | ICD-10-CM

## 2021-02-13 LAB
ALBUMIN SERPL ELPH-MCNC: 3.7 G/DL — SIGNIFICANT CHANGE UP (ref 3.4–5)
ALP SERPL-CCNC: 85 U/L — SIGNIFICANT CHANGE UP (ref 40–120)
ALT FLD-CCNC: 36 U/L — SIGNIFICANT CHANGE UP (ref 12–42)
ANION GAP SERPL CALC-SCNC: 9 MMOL/L — SIGNIFICANT CHANGE UP (ref 9–16)
ANISOCYTOSIS BLD QL: SLIGHT — SIGNIFICANT CHANGE UP
AST SERPL-CCNC: 47 U/L — HIGH (ref 15–37)
BASOPHILS # BLD AUTO: 0.04 K/UL — SIGNIFICANT CHANGE UP (ref 0–0.2)
BASOPHILS NFR BLD AUTO: 0.7 % — SIGNIFICANT CHANGE UP (ref 0–2)
BILIRUB SERPL-MCNC: 1.4 MG/DL — HIGH (ref 0.2–1.2)
BUN SERPL-MCNC: 17 MG/DL — SIGNIFICANT CHANGE UP (ref 7–23)
CALCIUM SERPL-MCNC: 9.1 MG/DL — SIGNIFICANT CHANGE UP (ref 8.5–10.5)
CHLORIDE SERPL-SCNC: 106 MMOL/L — SIGNIFICANT CHANGE UP (ref 96–108)
CO2 SERPL-SCNC: 25 MMOL/L — SIGNIFICANT CHANGE UP (ref 22–31)
CREAT SERPL-MCNC: 1.3 MG/DL — SIGNIFICANT CHANGE UP (ref 0.5–1.3)
EOSINOPHIL # BLD AUTO: 0.05 K/UL — SIGNIFICANT CHANGE UP (ref 0–0.5)
EOSINOPHIL NFR BLD AUTO: 0.9 % — SIGNIFICANT CHANGE UP (ref 0–6)
GLUCOSE SERPL-MCNC: 100 MG/DL — HIGH (ref 70–99)
HCT VFR BLD CALC: 46.6 % — SIGNIFICANT CHANGE UP (ref 39–50)
HGB BLD-MCNC: 15.6 G/DL — SIGNIFICANT CHANGE UP (ref 13–17)
IMM GRANULOCYTES NFR BLD AUTO: 0.6 % — SIGNIFICANT CHANGE UP (ref 0–1.5)
LYMPHOCYTES # BLD AUTO: 1.34 K/UL — SIGNIFICANT CHANGE UP (ref 1–3.3)
LYMPHOCYTES # BLD AUTO: 24.8 % — SIGNIFICANT CHANGE UP (ref 13–44)
MACROCYTES BLD QL: SLIGHT — SIGNIFICANT CHANGE UP
MANUAL SMEAR VERIFICATION: SIGNIFICANT CHANGE UP
MCHC RBC-ENTMCNC: 33.5 GM/DL — SIGNIFICANT CHANGE UP (ref 32–36)
MCHC RBC-ENTMCNC: 35.1 PG — HIGH (ref 27–34)
MCV RBC AUTO: 105 FL — HIGH (ref 80–100)
MONOCYTES # BLD AUTO: 0.44 K/UL — SIGNIFICANT CHANGE UP (ref 0–0.9)
MONOCYTES NFR BLD AUTO: 8.1 % — SIGNIFICANT CHANGE UP (ref 2–14)
NEUTROPHILS # BLD AUTO: 3.5 K/UL — SIGNIFICANT CHANGE UP (ref 1.8–7.4)
NEUTROPHILS NFR BLD AUTO: 64.9 % — SIGNIFICANT CHANGE UP (ref 43–77)
NRBC # BLD: 0 /100 WBCS — SIGNIFICANT CHANGE UP (ref 0–0)
NT-PROBNP SERPL-SCNC: 1429 PG/ML — HIGH
PLAT MORPH BLD: NORMAL — SIGNIFICANT CHANGE UP
PLATELET # BLD AUTO: 99 K/UL — LOW (ref 150–400)
POTASSIUM SERPL-MCNC: 4.4 MMOL/L — SIGNIFICANT CHANGE UP (ref 3.5–5.3)
POTASSIUM SERPL-SCNC: 4.4 MMOL/L — SIGNIFICANT CHANGE UP (ref 3.5–5.3)
PROT SERPL-MCNC: 7.2 G/DL — SIGNIFICANT CHANGE UP (ref 6.4–8.2)
RBC # BLD: 4.44 M/UL — SIGNIFICANT CHANGE UP (ref 4.2–5.8)
RBC # FLD: 13.7 % — SIGNIFICANT CHANGE UP (ref 10.3–14.5)
RBC BLD AUTO: ABNORMAL
SODIUM SERPL-SCNC: 140 MMOL/L — SIGNIFICANT CHANGE UP (ref 132–145)
TROPONIN I SERPL-MCNC: <0.017 NG/ML — LOW (ref 0.02–0.06)
WBC # BLD: 5.4 K/UL — SIGNIFICANT CHANGE UP (ref 3.8–10.5)
WBC # FLD AUTO: 5.4 K/UL — SIGNIFICANT CHANGE UP (ref 3.8–10.5)

## 2021-02-13 PROCEDURE — 70450 CT HEAD/BRAIN W/O DYE: CPT | Mod: 26,MH

## 2021-02-13 PROCEDURE — 93010 ELECTROCARDIOGRAM REPORT: CPT

## 2021-02-13 PROCEDURE — 99285 EMERGENCY DEPT VISIT HI MDM: CPT

## 2021-02-13 RX ORDER — MECLIZINE HCL 12.5 MG
1 TABLET ORAL
Qty: 21 | Refills: 0
Start: 2021-02-13 | End: 2021-02-19

## 2021-02-13 RX ORDER — MECLIZINE HCL 12.5 MG
25 TABLET ORAL ONCE
Refills: 0 | Status: COMPLETED | OUTPATIENT
Start: 2021-02-13 | End: 2021-02-13

## 2021-02-13 RX ORDER — MECLIZINE HCL 12.5 MG
1 TABLET ORAL
Qty: 21 | Refills: 0
Start: 2021-02-13 | End: 2021-02-20

## 2021-02-13 RX ADMIN — Medication 25 MILLIGRAM(S): at 19:40

## 2021-02-13 NOTE — ED PROVIDER NOTE - PROGRESS NOTE DETAILS
rechecked pt - his symptoms resolved after the meclizine and he is requesting d/c. CTH unremarkable. labs pending. labs nonacute, pt continues to request discharge. feeling well. will d/c.

## 2021-02-13 NOTE — ED PROVIDER NOTE - ENMT, MLM
Airway patent, Nasal mucosa clear. Mouth with normal mucosa. Throat has no vesicles, no oropharyngeal exudates and uvula is midline. semi-permanent in-dwelling hearing aids, yellow. unable to visualize TMs. + reproducible by violet artis.

## 2021-02-13 NOTE — ED PROVIDER NOTE - PATIENT PORTAL LINK FT
You can access the FollowMyHealth Patient Portal offered by [facility] by registering at the following website: [email or web address]. By joining [employer]’s FollowMyHealth portal, you will also be able to view your health information using other applications (apps) compatible with our system.

## 2021-02-13 NOTE — ED ADULT NURSE REASSESSMENT NOTE - NS ED NURSE REASSESS COMMENT FT1
Pt care handed over to myself from swetha hernandez, introduced self to patient, vital signs as charted, pt answering all questions appropriately, have called pts friend didier  as pt phone about to die to update friend as he will be concerened, awaiting blood results and ct results +/- dc, pt due to see his cardiologist "in a month"

## 2021-02-13 NOTE — ED PROVIDER NOTE - OBJECTIVE STATEMENT
85yo M with h/o HIV, HLD, CAD s/p 6 vessel bypass c/b arrythmia s/p pacemaker placement presents today c/o vertigo since this morning. pt notes he had his second dose of covid vaccine 2 days ago and today woke up with vertigo. he notes he has had similar vertigo in the past, 40 years ago, and it resolved after taking meclizine. he denies any headache, dizziness, nausea, vomiting, numbness, tingling, weakness, vision changes, speech changes, nasal congestion, ear pain, sore throat, cough, any other concerns. notes the dizziness is worse when he lays down and improves when he is standing.

## 2021-02-13 NOTE — ED PROVIDER NOTE - NSFOLLOWUPINSTRUCTIONS_ED_ALL_ED_FT
Benign Paroxysmal Positional Vertigo    WHAT YOU NEED TO KNOW:    BPPV is an inner ear condition that causes you to suddenly feel dizzy. Benign means it is not serious or life-threatening. BPPV is caused by a problem with the nerves and structure of your inner ear. BPPV happens when small pieces of calcium break loose and lump together in one of your inner ear canals. Ear Anatomy         DISCHARGE INSTRUCTIONS:    Return to the emergency department if:     You fall during a BPPV episode and are injured.      You have a severe headache that does not go away.      You have new changes in your vision or feel weak or confused.      You have problems hearing, or you have ringing or buzzing in your ears.    Contact your healthcare provider if:     Your BPPV symptoms do not go away or they return.      You have problems with your balance, or you are falling often.      You have new or increased nausea or vomiting with vertigo.      You feel anxious or depressed and do not want to leave your home.      You have questions or concerns about your condition or care.    Medicines:     Medicines may be recommended or prescribed to treat dizziness or nausea.      Take your medicine as directed. Contact your healthcare provider if you think your medicine is not helping or if you have side effects. Tell him of her if you are allergic to any medicine. Keep a list of the medicines, vitamins, and herbs you take. Include the amounts, and when and why you take them. Bring the list or the pill bottles to follow-up visits. Carry your medicine list with you in case of an emergency.    Prevent your symptoms:     Try to avoid sudden head movements. Stand up and lie down slowly.       Raise and support your head when you lie down. Place pillows under your upper back and head or rest in a recliner.       Change your position often when you are lying down. Try not to lie with your head on the same side for long periods of time. Roll over slowly.       Wear protective gear when you ride a bike or play sports. A helmet helps protect your head from injury.    Follow up with your healthcare provider as directed: You may need to return in 1 month to check the progress of your treatment. Write down your questions so you remember to ask them during your visits.       TAKE MECLIZINE AS PRESCRIBED AND FOLLOW UP WITH YOUR PMD THIS WEEK FOR FURTHER TESTING AND EVALUATION.

## 2021-04-05 ENCOUNTER — EMERGENCY (EMERGENCY)
Facility: HOSPITAL | Age: 86
LOS: 1 days | Discharge: HOME | End: 2021-04-05
Attending: EMERGENCY MEDICINE | Admitting: EMERGENCY MEDICINE
Payer: MEDICARE

## 2021-04-05 VITALS
OXYGEN SATURATION: 95 % | RESPIRATION RATE: 18 BRPM | TEMPERATURE: 98 F | SYSTOLIC BLOOD PRESSURE: 134 MMHG | HEART RATE: 61 BPM | DIASTOLIC BLOOD PRESSURE: 81 MMHG

## 2021-04-05 VITALS
WEIGHT: 136.03 LBS | SYSTOLIC BLOOD PRESSURE: 132 MMHG | HEART RATE: 67 BPM | OXYGEN SATURATION: 96 % | HEIGHT: 64 IN | TEMPERATURE: 98 F | RESPIRATION RATE: 18 BRPM | DIASTOLIC BLOOD PRESSURE: 67 MMHG

## 2021-04-05 DIAGNOSIS — Z82.49 FAMILY HISTORY OF ISCHEMIC HEART DISEASE AND OTHER DISEASES OF THE CIRCULATORY SYSTEM: Chronic | ICD-10-CM

## 2021-04-05 DIAGNOSIS — Z95.0 PRESENCE OF CARDIAC PACEMAKER: Chronic | ICD-10-CM

## 2021-04-05 DIAGNOSIS — Z98.890 OTHER SPECIFIED POSTPROCEDURAL STATES: Chronic | ICD-10-CM

## 2021-04-05 LAB
ANION GAP SERPL CALC-SCNC: 6 MMOL/L — LOW (ref 9–16)
BASOPHILS # BLD AUTO: 0.04 K/UL — SIGNIFICANT CHANGE UP (ref 0–0.2)
BASOPHILS NFR BLD AUTO: 0.9 % — SIGNIFICANT CHANGE UP (ref 0–2)
BUN SERPL-MCNC: 26 MG/DL — HIGH (ref 7–23)
CALCIUM SERPL-MCNC: 9.2 MG/DL — SIGNIFICANT CHANGE UP (ref 8.5–10.5)
CHLORIDE SERPL-SCNC: 106 MMOL/L — SIGNIFICANT CHANGE UP (ref 96–108)
CO2 SERPL-SCNC: 27 MMOL/L — SIGNIFICANT CHANGE UP (ref 22–31)
CREAT SERPL-MCNC: 1.48 MG/DL — HIGH (ref 0.5–1.3)
EOSINOPHIL # BLD AUTO: 0.03 K/UL — SIGNIFICANT CHANGE UP (ref 0–0.5)
EOSINOPHIL NFR BLD AUTO: 0.7 % — SIGNIFICANT CHANGE UP (ref 0–6)
GLUCOSE SERPL-MCNC: 91 MG/DL — SIGNIFICANT CHANGE UP (ref 70–99)
HCT VFR BLD CALC: 44.6 % — SIGNIFICANT CHANGE UP (ref 39–50)
HGB BLD-MCNC: 15.1 G/DL — SIGNIFICANT CHANGE UP (ref 13–17)
IMM GRANULOCYTES NFR BLD AUTO: 0.2 % — SIGNIFICANT CHANGE UP (ref 0–1.5)
LYMPHOCYTES # BLD AUTO: 1.26 K/UL — SIGNIFICANT CHANGE UP (ref 1–3.3)
LYMPHOCYTES # BLD AUTO: 28.8 % — SIGNIFICANT CHANGE UP (ref 13–44)
MCHC RBC-ENTMCNC: 33.9 GM/DL — SIGNIFICANT CHANGE UP (ref 32–36)
MCHC RBC-ENTMCNC: 35.3 PG — HIGH (ref 27–34)
MCV RBC AUTO: 104.2 FL — HIGH (ref 80–100)
MONOCYTES # BLD AUTO: 0.34 K/UL — SIGNIFICANT CHANGE UP (ref 0–0.9)
MONOCYTES NFR BLD AUTO: 7.8 % — SIGNIFICANT CHANGE UP (ref 2–14)
NEUTROPHILS # BLD AUTO: 2.69 K/UL — SIGNIFICANT CHANGE UP (ref 1.8–7.4)
NEUTROPHILS NFR BLD AUTO: 61.6 % — SIGNIFICANT CHANGE UP (ref 43–77)
NRBC # BLD: 0 /100 WBCS — SIGNIFICANT CHANGE UP (ref 0–0)
PLATELET # BLD AUTO: 89 K/UL — LOW (ref 150–400)
POTASSIUM SERPL-MCNC: 4.2 MMOL/L — SIGNIFICANT CHANGE UP (ref 3.5–5.3)
POTASSIUM SERPL-SCNC: 4.2 MMOL/L — SIGNIFICANT CHANGE UP (ref 3.5–5.3)
RBC # BLD: 4.28 M/UL — SIGNIFICANT CHANGE UP (ref 4.2–5.8)
RBC # FLD: 14 % — SIGNIFICANT CHANGE UP (ref 10.3–14.5)
SODIUM SERPL-SCNC: 139 MMOL/L — SIGNIFICANT CHANGE UP (ref 132–145)
WBC # BLD: 4.37 K/UL — SIGNIFICANT CHANGE UP (ref 3.8–10.5)
WBC # FLD AUTO: 4.37 K/UL — SIGNIFICANT CHANGE UP (ref 3.8–10.5)

## 2021-04-05 PROCEDURE — 99284 EMERGENCY DEPT VISIT MOD MDM: CPT

## 2021-04-05 RX ORDER — VANCOMYCIN HCL 1 G
1000 VIAL (EA) INTRAVENOUS ONCE
Refills: 0 | Status: COMPLETED | OUTPATIENT
Start: 2021-04-05 | End: 2021-04-05

## 2021-04-05 RX ADMIN — Medication 250 MILLIGRAM(S): at 12:40

## 2021-04-05 NOTE — ED PROVIDER NOTE - PATIENT PORTAL LINK FT
You can access the FollowMyHealth Patient Portal offered by [facility] by registering at the following website: [email or web address]. By joining [facility]’s FollowMyHealth portal, you will also be able to view your health information using other applications (apps) compatible with our system.

## 2021-04-05 NOTE — ED PROVIDER NOTE - CLINICAL SUMMARY MEDICAL DECISION MAKING FREE TEXT BOX
85 y/o M with Hx of HIV and CAD (s/p CABG 1 year ago) presenting with L knee pain, swelling, warmth and redness s/p mechanical fall several days ago. Exam is remarkable for a 4x4 cm area of tenderness, warmth, and blanching erythema overlying the L patella. Plan for routine labs and Tx with Vancomycin. Will likely D/C afterwards.

## 2021-04-05 NOTE — ED PROVIDER NOTE - OBJECTIVE STATEMENT
85 y/o M with PMHx of HIV (CD4 count in the 600-800's) and CAD s/p CABG 1 year ago presents to the ED today for evaluation. Pt reports having increased fatigue and frequent falls since CABG and due to "uneven sidewalks" around his house. Several days ago, Pt had a mechanical trip and fall and landed onto his L knee. He did not have LOC or head strike at the time. He was able to get back up and ambulate afterwards. However, he noted some pain, swelling, warmth and redness overlying the L patella. Pt denies difficulty walking/bearing weight, fevers and chills. Of note, Pt states he had a similar episode a year ago on the R knee that ended up requiring hospitalization in the setting of severe cellulitis. Pt has drug allergies to Sulfa / Bactrim.

## 2021-04-05 NOTE — ED PROVIDER NOTE - PHYSICAL EXAMINATION
VITAL SIGNS: I have reviewed nursing notes and confirm.  CONSTITUTIONAL: Well-developed; well-nourished; in no acute distress.  SKIN: Skin exam is warm and dry, no acute rash.  HEAD: Normocephalic; atraumatic.  EYES: PERRL, EOM intact; conjunctiva and sclera clear.  ENT: No nasal discharge; airway clear.  NECK: Supple; non tender.  CARD: S1, S2 normal; no murmurs, gallops, or rubs. Regular rate and rhythm.  RESP: Unlabored. No wheezes, rales or rhonchi.  ABD: soft; non-distended; non-tender  MSK: 4x4 cm area of tenderness, warmth, and blanching erythema overlying the L patella. no knee effusion. FROM of the knee. Gait is intact.   LYMPH: No acute cervical adenopathy.  NEURO: Alert, oriented. Grossly unremarkable.  PSYCH: Cooperative, appropriate.

## 2021-04-06 ENCOUNTER — EMERGENCY (EMERGENCY)
Facility: HOSPITAL | Age: 86
LOS: 1 days | Discharge: HOME | End: 2021-04-06
Attending: EMERGENCY MEDICINE | Admitting: EMERGENCY MEDICINE
Payer: MEDICARE

## 2021-04-06 VITALS
WEIGHT: 134.92 LBS | SYSTOLIC BLOOD PRESSURE: 133 MMHG | HEIGHT: 64 IN | RESPIRATION RATE: 16 BRPM | HEART RATE: 62 BPM | DIASTOLIC BLOOD PRESSURE: 86 MMHG | OXYGEN SATURATION: 95 % | TEMPERATURE: 98 F

## 2021-04-06 VITALS
DIASTOLIC BLOOD PRESSURE: 92 MMHG | TEMPERATURE: 98 F | SYSTOLIC BLOOD PRESSURE: 154 MMHG | RESPIRATION RATE: 18 BRPM | OXYGEN SATURATION: 97 % | HEART RATE: 60 BPM

## 2021-04-06 DIAGNOSIS — Z88.2 ALLERGY STATUS TO SULFONAMIDES: ICD-10-CM

## 2021-04-06 DIAGNOSIS — Z98.890 OTHER SPECIFIED POSTPROCEDURAL STATES: Chronic | ICD-10-CM

## 2021-04-06 DIAGNOSIS — Z95.0 PRESENCE OF CARDIAC PACEMAKER: Chronic | ICD-10-CM

## 2021-04-06 DIAGNOSIS — L03.116 CELLULITIS OF LEFT LOWER LIMB: ICD-10-CM

## 2021-04-06 DIAGNOSIS — Z79.899 OTHER LONG TERM (CURRENT) DRUG THERAPY: ICD-10-CM

## 2021-04-06 DIAGNOSIS — Z82.49 FAMILY HISTORY OF ISCHEMIC HEART DISEASE AND OTHER DISEASES OF THE CIRCULATORY SYSTEM: Chronic | ICD-10-CM

## 2021-04-06 DIAGNOSIS — Z88.8 ALLERGY STATUS TO OTHER DRUGS, MEDICAMENTS AND BIOLOGICAL SUBSTANCES: ICD-10-CM

## 2021-04-06 DIAGNOSIS — Z79.01 LONG TERM (CURRENT) USE OF ANTICOAGULANTS: ICD-10-CM

## 2021-04-06 PROCEDURE — 99284 EMERGENCY DEPT VISIT MOD MDM: CPT

## 2021-04-06 RX ORDER — VANCOMYCIN HCL 1 G
1000 VIAL (EA) INTRAVENOUS ONCE
Refills: 0 | Status: COMPLETED | OUTPATIENT
Start: 2021-04-06 | End: 2021-04-06

## 2021-04-06 RX ADMIN — Medication 250 MILLIGRAM(S): at 12:32

## 2021-04-06 NOTE — ED ADULT TRIAGE NOTE - CHIEF COMPLAINT QUOTE
Pt seen yesterday for eval of knee injury. Told to come back today for wound check. Pt reports improvement pain and swelloing

## 2021-04-06 NOTE — ED PROVIDER NOTE - OBJECTIVE STATEMENT
wound check for L knee cellulitis, seen yesterday, given vancomycin, discharged on clinda but hasn't started. Reports improved pain/redness after abx yesterday. No fever.

## 2021-04-06 NOTE — ED ADULT NURSE NOTE - OBJECTIVE STATEMENT
return to ED for wound check to right knee cellulitic wound. Area with marked improvement, pt denies any additional pain or drainage, no fevers, wound has not exceeded margins from previous visit. Pt scheduled for IV abx, will medicate as ordered.

## 2021-04-06 NOTE — ED PROVIDER NOTE - CLINICAL SUMMARY MEDICAL DECISION MAKING FREE TEXT BOX
Improving cellulitis. Given that pt didn't take his abx yesterday will give 2nd dose vancomycin. d/c home on clinda. f/u with PMD. Pt has a chronic tremor and for that will offer f/u with neuro.

## 2021-04-06 NOTE — ED PROVIDER NOTE - PATIENT PORTAL LINK FT
You can access the FollowMyHealth Patient Portal offered by [facility] by registering at the following website: [email or web address]. By joining [employer]’s FollowMyHealth portal, you will also be able to view your health information using other applications (apps) compatible with our system.

## 2021-04-06 NOTE — ED ADULT NURSE NOTE - TEMPLATE
----- Message from Mona Lou sent at 10/31/2017  2:52 PM EDT -----  Contact: [phone]  REFILL REQUEST:   -HYDROcodone-acetaminophen (NORCO) 7.5-325 MG per tablet       LSD: 8.30.17    PT IS AWARE THAT WE WILL CALL WHEN SCRIPT IS READY. ALLOW 24 TO 48 HOURS   THANK YOU     Last fill 8/30/17  Last velasquez 8/31/17  nexat appt 11/29/17   Wound Check/Suture Removal

## 2021-04-06 NOTE — ED PROVIDER NOTE - CARE PROVIDER_API CALL
Rocco Barnes)  Neurology; Neuromuscular Medicine  [address], [address] [place] [address]  Phone: [phone]  Fax: [phone]  Follow Up Time:

## 2021-04-06 NOTE — ED PROVIDER NOTE - PHYSICAL EXAMINATION
CONSTITUTIONAL: Well appearing, well nourished, awake, alert, oriented to person, place, time/situation and in no apparent distress.  ENT: Airway patent, Nasal mucosa clear. Mouth with normal mucosa.  EYES: Clear bilaterally.  RESPIRATORY: Breathing comfortably with normal RR.  MSK: Range of motion is not limited, no deformities noted.  NEURO: Alert and oriented, no focal deficits.  SKIN: Skin normal color for race, warm, dry and intact. 3x3cm area of warm blanchign erythema overlying L patella  PSYCH: Alert and oriented to person, place, time/situation. normal mood and affect. no apparent risk to self or others.

## 2021-04-06 NOTE — ED PROVIDER NOTE - TEMPLATE, MLM
----- Message from Alva Mathur NP sent at 7/31/2020 11:12 AM CDT -----  Lung biopsy negative for cancer.   General

## 2021-04-08 DIAGNOSIS — Z79.899 OTHER LONG TERM (CURRENT) DRUG THERAPY: ICD-10-CM

## 2021-04-08 DIAGNOSIS — L03.116 CELLULITIS OF LEFT LOWER LIMB: ICD-10-CM
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
## 2021-04-08 DIAGNOSIS — I25.10 ATHEROSCLEROTIC HEART DISEASE OF NATIVE CORONARY ARTERY WITHOUT ANGINA PECTORIS: ICD-10-CM

## 2021-04-08 DIAGNOSIS — Z88.8 ALLERGY STATUS TO OTHER DRUGS, MEDICAMENTS AND BIOLOGICAL SUBSTANCES STATUS: ICD-10-CM

## 2021-04-08 DIAGNOSIS — M25.562 PAIN IN LEFT KNEE: ICD-10-CM

## 2021-04-08 DIAGNOSIS — Z98.890 OTHER SPECIFIED POSTPROCEDURAL STATES: ICD-10-CM

## 2021-04-08 DIAGNOSIS — Z95.1 PRESENCE OF AORTOCORONARY BYPASS GRAFT: ICD-10-CM

## 2021-04-08 DIAGNOSIS — Z79.2 LONG TERM (CURRENT) USE OF ANTIBIOTICS: ICD-10-CM
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
## 2021-04-08 DIAGNOSIS — Z95.0 PRESENCE OF CARDIAC PACEMAKER: ICD-10-CM

## 2021-04-08 DIAGNOSIS — Z88.1 ALLERGY STATUS TO OTHER ANTIBIOTIC AGENTS STATUS: ICD-10-CM

## 2021-04-08 DIAGNOSIS — Z79.01 LONG TERM (CURRENT) USE OF ANTICOAGULANTS: ICD-10-CM

## 2021-04-08 DIAGNOSIS — Z21 ASYMPTOMATIC HUMAN IMMUNODEFICIENCY VIRUS [HIV] INFECTION STATUS: ICD-10-CM

## 2021-04-08 DIAGNOSIS — E78.5 HYPERLIPIDEMIA, UNSPECIFIED: ICD-10-CM

## 2021-04-18 ENCOUNTER — EMERGENCY (EMERGENCY)
Facility: HOSPITAL | Age: 86
LOS: 1 days | Discharge: HOME | End: 2021-04-18
Attending: EMERGENCY MEDICINE | Admitting: EMERGENCY MEDICINE
Payer: MEDICARE

## 2021-04-18 VITALS
HEIGHT: 64 IN | OXYGEN SATURATION: 96 % | DIASTOLIC BLOOD PRESSURE: 66 MMHG | RESPIRATION RATE: 16 BRPM | HEART RATE: 61 BPM | TEMPERATURE: 98 F | WEIGHT: 134.92 LBS | SYSTOLIC BLOOD PRESSURE: 145 MMHG

## 2021-04-18 DIAGNOSIS — Z88.2 ALLERGY STATUS TO SULFONAMIDES: ICD-10-CM

## 2021-04-18 DIAGNOSIS — Z79.83 LONG TERM (CURRENT) USE OF BISPHOSPHONATES: ICD-10-CM

## 2021-04-18 DIAGNOSIS — Z79.01 LONG TERM (CURRENT) USE OF ANTICOAGULANTS: ICD-10-CM

## 2021-04-18 DIAGNOSIS — Z48.00 ENCOUNTER FOR CHANGE OR REMOVAL OF NONSURGICAL WOUND DRESSING: ICD-10-CM

## 2021-04-18 DIAGNOSIS — Z98.890 OTHER SPECIFIED POSTPROCEDURAL STATES: Chronic | ICD-10-CM

## 2021-04-18 DIAGNOSIS — Z82.49 FAMILY HISTORY OF ISCHEMIC HEART DISEASE AND OTHER DISEASES OF THE CIRCULATORY SYSTEM: Chronic | ICD-10-CM

## 2021-04-18 DIAGNOSIS — Z95.0 PRESENCE OF CARDIAC PACEMAKER: Chronic | ICD-10-CM

## 2021-04-18 PROCEDURE — 99282 EMERGENCY DEPT VISIT SF MDM: CPT

## 2021-04-18 NOTE — ED PROVIDER NOTE - OBJECTIVE STATEMENT
86 M returning for cellulitis check of L knee.  Finished Clindamycin on Friday.  Pt with minimal residual pain, but no swelling, redness or fever.

## 2021-04-18 NOTE — ED PROVIDER NOTE - PATIENT PORTAL LINK FT
You can access the FollowMyHealth Patient Portal offered by [facility] by registering at the following website: [email or web address]. By joining [employer]’s FollowMyHealth portal, you will also be able to view your health information using other applications (apps) compatible with our system.

## 2021-05-05 ENCOUNTER — EMERGENCY (EMERGENCY)
Facility: HOSPITAL | Age: 86
LOS: 1 days | Discharge: HOME | End: 2021-05-05
Admitting: EMERGENCY MEDICINE
Payer: MEDICARE

## 2021-05-05 VITALS
DIASTOLIC BLOOD PRESSURE: 92 MMHG | HEIGHT: 64 IN | RESPIRATION RATE: 16 BRPM | HEART RATE: 62 BPM | TEMPERATURE: 98 F | SYSTOLIC BLOOD PRESSURE: 190 MMHG | OXYGEN SATURATION: 95 % | WEIGHT: 153 LBS

## 2021-05-05 DIAGNOSIS — Z82.49 FAMILY HISTORY OF ISCHEMIC HEART DISEASE AND OTHER DISEASES OF THE CIRCULATORY SYSTEM: Chronic | ICD-10-CM

## 2021-05-05 DIAGNOSIS — S81.851A OPEN BITE, RIGHT LOWER LEG, INITIAL ENCOUNTER: ICD-10-CM
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
## 2021-05-05 DIAGNOSIS — Z98.890 OTHER SPECIFIED POSTPROCEDURAL STATES: Chronic | ICD-10-CM

## 2021-05-05 DIAGNOSIS — Z21 ASYMPTOMATIC HUMAN IMMUNODEFICIENCY VIRUS [HIV] INFECTION STATUS: ICD-10-CM

## 2021-05-05 DIAGNOSIS — Z88.2 ALLERGY STATUS TO SULFONAMIDES: ICD-10-CM
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
## 2021-05-05 DIAGNOSIS — Z23 ENCOUNTER FOR IMMUNIZATION: ICD-10-CM

## 2021-05-05 DIAGNOSIS — Z95.0 PRESENCE OF CARDIAC PACEMAKER: Chronic | ICD-10-CM

## 2021-05-05 PROCEDURE — 99283 EMERGENCY DEPT VISIT LOW MDM: CPT

## 2021-05-05 RX ORDER — TETANUS TOXOID, REDUCED DIPHTHERIA TOXOID AND ACELLULAR PERTUSSIS VACCINE, ADSORBED 5; 2.5; 8; 8; 2.5 [IU]/.5ML; [IU]/.5ML; UG/.5ML; UG/.5ML; UG/.5ML
0.5 SUSPENSION INTRAMUSCULAR ONCE
Refills: 0 | Status: COMPLETED | OUTPATIENT
Start: 2021-05-05 | End: 2021-05-05

## 2021-05-05 RX ADMIN — TETANUS TOXOID, REDUCED DIPHTHERIA TOXOID AND ACELLULAR PERTUSSIS VACCINE, ADSORBED 0.5 MILLILITER(S): 5; 2.5; 8; 8; 2.5 SUSPENSION INTRAMUSCULAR at 17:58

## 2021-05-05 RX ADMIN — Medication 1 TABLET(S): at 17:58

## 2021-05-05 NOTE — ED PROVIDER NOTE - OBJECTIVE STATEMENT
87 yo male with hx HIV on meds presents c/o dog bite to right shin today, states it was his neighbor's dog. neighbor states dog's vaccines  are utd. patient does not remember last tetanus vaccination. states he cleansed wound with peroxide.

## 2021-05-05 NOTE — ED ADULT NURSE NOTE - NSIMPLEMENTINTERV_GEN_ALL_ED
Implemented All Fall with Harm Risk Interventions:  [place] to call system. Call bell, personal items and telephone within reach. Instruct patient to call for assistance. Room bathroom lighting operational. Non-slip footwear when patient is off stretcher. Physically safe environment: no spills, clutter or unnecessary equipment. Stretcher in lowest position, wheels locked, appropriate side rails in place. Provide visual cue, wrist band, yellow gown, etc. Monitor gait and stability. Monitor for mental status changes and reorient to person, place, and time. Review medications for side effects contributing to fall risk. Reinforce activity limits and safety measures with patient and family. Provide visual clues: red socks.

## 2021-05-05 NOTE — ED PROVIDER NOTE - PATIENT PORTAL LINK FT
You can access the FollowMyHealth Patient Portal offered by [facility] by registering at the following website: [email or web address]. By joining [employer]’s FollowMyHealth portal, you will also be able to view your health information using other applications (apps) compatible with our system.

## 2021-05-05 NOTE — ED PROVIDER NOTE - PHYSICAL EXAMINATION
CONSTITUTIONAL: Well-appearing; well-nourished; in no apparent distress.   	HEAD: Normocephalic; atraumatic.   	RLE: dog bite to anterior right shin, no active bleeding or fb. no visible bone or nerve. no sensory or motor deficits, distal pulses intact. soft compartments. full ROM joints. ambulatory  	NEURO: A & O x 3; face symmetric; grossly unremarkable.   PSYCHOLOGICAL: The patient’s mood and manner are appropriate.

## 2021-05-05 NOTE — ED ADULT NURSE NOTE - OBJECTIVE STATEMENT
Pt came in c/o of dog bite earlier today. Pt presents with abrasion to right shin. Pt reports dog utd on vaccinations. Reports history of cellulitis in right foot in the past. Denies fever, chills, sob, cp, n/v/d. A&Ox3 speaking in full sentences Pt came in c/o of dog bite earlier today. Pt presents with abrasion to right shin. Pt reports dog utd on vaccinations. No edema noted. Reports history of cellulitis in right foot in the past. Denies fever, chills, sob, cp, n/v/d. A&Ox3 speaking in full sentences

## 2021-05-05 NOTE — ED PROVIDER NOTE - NSFOLLOWUPINSTRUCTIONS_ED_ALL_ED_FT
Take antibiotics as prescribed  Leg elevation   Keep wound clean and dry    Follow up with your doctor    Return for signs of infection such as fever, pus, swelling or any concerns.    Take Ibuprofen 600mg every 6-8 hours as needed for pain, take with food, and in addition you may take Tylenol 500 mg every 6-8 hours as needed for pain

## 2021-05-05 NOTE — ED PROVIDER NOTE - CLINICAL SUMMARY MEDICAL DECISION MAKING FREE TEXT BOX
right lower extremity dog bite, wound cleansed, rx augmentin, tetanus updated. leg elevation, f/u pmd, return precautions discussed.

## 2021-06-14 NOTE — ED ADULT NURSE NOTE - NURSING SKIN WOUND LOCATION #1
Start the antibiotics today.  Use the Pyridium for painful urination.  It does turn the urine orange so please be forewarned of that.  Return to the ER if new or worsening symptoms such as high fever, vomiting etc.  Try to find a different substitute for alcohol use moving forward.  I am glad to hear that you are doing better.   knee

## 2021-12-25 NOTE — ED PROVIDER NOTE - NS_ACPWITHSCRIBE_ED_ALL_ED
Statement Selected "I personally performed the services described in the documentation  recorded by the scribe in my presence, and it accurately and completely records my words and action.”

## 2022-01-01 NOTE — ED ADULT TRIAGE NOTE - NS ED NURSE BANDS TYPE
Name band;

## 2022-04-04 NOTE — ED ADULT NURSE NOTE - OBJECTIVE STATEMENT
Rx Refill Note  Requested Prescriptions     Pending Prescriptions Disp Refills   • rosuvastatin (CRESTOR) 10 MG tablet [Pharmacy Med Name: ROSUVASTATIN CALCIUM 10 MG TAB] 90 tablet      Sig: TAKE ONE TABLET BY MOUTH DAILY      Last office visit with prescribing clinician: 3/15/2021      Next office visit with prescribing clinician: 4/25/2022            Ellen Flores LPN  04/04/22, 10:12 EDT  
c/o intermittent rle pain and swelling x 1 week, denies pain at present, ppp x 2, no s/s of distress, will continue to monitor

## 2022-08-25 NOTE — ED ADULT TRIAGE NOTE - IDEAL BODY WEIGHT(KG)
59 Consent: Written consent obtained. Risks include but not limited to lid/brow ptosis, bruising, swelling, diplopia, temporary effect, incomplete chemical denervation.

## 2022-11-14 NOTE — ED ADULT NURSE NOTE - ED COMFORT CARE
After Visit Summary:    Face- nose and left cheek  - stop fluorouracil  - start mupirocin + triamcinolone ointment, mix mupirocin with triamcinolone ointment 50:50 on fingertips and apply to affected areas twice daily (am, pm) until healed  - please kindly update us in 1 week or 2 with your progress      Sunscreen: potential FDA recall of certain sunscreens coming up, recommend sticking to safe list below, recommend SPF , reapply every 2 hours when out     Sun Screen Safe List:  https://www.[employer]/wp-content/uploads/Tjxcfdieqh-P-Yeyjx-6-xj-Spuhlrmn-FDA-Citizen-Petition-on-Sunscreen-v2.pdf    Return for follow up in: as scheduled, sooner as needed    Please call with any questions or concerns     Graciela Sanchez MD  [facility]  [phone]    In the next few weeks you may receive a link to an online survey, or a [employer] survey by mail, regarding your most recent clinic visit with us.  Please take a few moments to accurately evaluate your visit. We strive to provide you with the best medical care. Your feedback will assist us in achieving this. Again, thank you for your time and we look forward to your next visit.       
Explanation of wait/Patient informed

## 2022-12-16 NOTE — ED ADULT NURSE NOTE - SUICIDE SCREENING DEPRESSION
Negative [Joint Pain] : joint pain [Joint Stiffness] : joint stiffness [Joint Swelling] : joint swelling [Negative] : Heme/Lymph

## 2022-12-21 NOTE — ED PROVIDER NOTE - PMH
Coronary artery disease involving native heart, angina presence unspecified, unspecified vessel or lesion type    HIV (human immunodeficiency virus infection)    HLD (hyperlipidemia)    Pacemaker Niacinamide Counseling: I recommended taking niacin or niacinamide, also know as vitamin B3, twice daily. Recent evidence suggests that taking vitamin B3 (500 mg twice daily) can reduce the risk of actinic keratoses and non-melanoma skin cancers. Side effects of vitamin B3 include flushing and headache.

## 2023-03-08 NOTE — ED ADULT TRIAGE NOTE - TEMPERATURE IN FAHRENHEIT (DEGREES F)
99.2 CONST: Well appearing in NAD  EYES: PERRL, EOMI, Sclera and conjunctiva clear.   ENT: No nasal discharge. TM's clear B/L without drainage. Oropharynx normal appearing, no erythema or exudates. Uvula midline.  NECK: Non-tender, no meningeal signs  CARD: Normal S1 S2; Normal rate and rhythm  RESP: Equal BS B/L, No wheezes, rhonchi or rales. No distress  GI: Soft, non-tender, non-distended.  MS: Normal ROM in all extremities. No midline spinal tenderness.  SKIN: Warm, dry, no acute rashes. Good turgor  NEURO: A&Ox3, No focal deficits. Strength 5/5 with no sensory deficits. Steady gait

## 2023-05-07 NOTE — ED ADULT NURSE NOTE - NSFALLRSKPSTHSTOCCUR_ED_ALL_ED
Time-based billing (NON-critical care)
Single Mechanical/Accidental Fall

## 2023-06-13 NOTE — ED ADULT NURSE NOTE - NSFALLRSKPASTHIST_ED_ALL_ED
yes PAST SURGICAL HISTORY:  History of cholecystectomy     No significant past surgical history     S/P hernia surgery     S/P shoulder surgery

## 2023-07-16 NOTE — ED PROVIDER NOTE - IV ALTEPLASE EXCL REL HIDDEN
[facility] Note     PATIENT: Sandie Tolliver female   : 1949   MRN: [id number]     CC   Cough (For 2 weeks)     HPI   Sandie Tolliver 74 year old female presenting for cough, sinus pain and ear pain. Symptoms have been going on for > 2 weeks. For symptom relief, Sandie has tried nsaid, resting, OTC cold/cough multi symptom medication, allergy medication and decongestant. Symptoms are worse since onset.  Eating and drinking fluids normally, no trouble breathing wheezing or chest tightness., no concerns for dehydration and no trismus, trouble swallowing, drooling or vocal changes.  Reports she is now coughing out colorful mucus and has sinus pain.  Negative for exposure to illness, fever, chills, headache, body aches, throat pain, decreased appetite, fatigue, shortness of breath, wheezing, chest pain, abdominal pain, weakness, nausea, vomiting, diarrhea, neck pain/stiffness, dizziness, lightheadedness, rash, loss of taste/smell and UTI symptoms.     History     Patient Active Problem List   Diagnosis   • Chronic vulvitis   • Controlled type 2 diabetes mellitus without complication, without long-term current use of insulin (CMD)   • Hypertension associated with diabetes (CMD)   • JESUS ALBERTO on CPAP 9 cwp  Dr Sheldon   • Polyp of sigmoid colon ; Last colonoscopy  w poor prep so next scope due    • OAB (overactive bladder)   • Hx of iron deficiency anemia 2/2 gstric bypass; EGD/ colonoscopy NEG    • Class 3 severe obesity due to excess calories with serious comorbidity and body mass index (BMI) of 45.0 to 49.9 in adult (CMD)   • Vitamin D deficiency   • Ventral hernia without obstruction or gangrene   • Mild anemia      Past Medical History:   Diagnosis Date   • Anemia, iron deficiency    • Chronic pain of both knees 2019   • CKD (chronic kidney disease)    • Colon polyps    • Diabetes mellitus (CMD)    • Diabetic eye exam (CMD) 2019    WNL   • Dyslipidemia    • Essential  (primary) hypertension    • Hypertensive retinopathy    • Morbid obesity with BMI of 45.0-49.9, adult (CMD)    • POAG (primary open-angle glaucoma)     mild, bilaterally   • Sleep apnea    • Vitamin D deficiency       Past Surgical History:   Procedure Laterality Date   • ANKLE SURGERY N/A    • COLONOSCOPY     • GASTRIC BYPASS N/A    • JOINT REPLACEMENT  02/2020    left knee   • REMOVAL GALLBLADDER        Family History   Problem Relation Age of Onset   • Hypertension Mother    • Hypertension Sister    • Diabetes Sister    • Cancer, Breast Sister    • Cancer, Colon Neg Hx    • Cancer, Thyroid Neg Hx      reports that she has never smoked. She has never used smokeless tobacco. She reports that she does not drink alcohol and does not use drugs.    Allergies   ALLERGIES:  Lisinopril and Penicillins    Medications   albuterol  amLODIPine  B-12 SL Tab  blood glucose  Blood Glucose Monitoring Suppl Kit  brimonidine  doxycycline hyclate  ergocalciferol  estradiol  ferrous sulfate  hydroCORTisone  ibuprofen  Lancets Thin Misc  latanoprost  metFORMIN  Mounjaro Solution Pen-injector  oxybutynin  pravastatin  Promethazine-DM Solution  triamterene-hydrochlorothiazide    ROS   Positive ROS: See above.  Negative ROS: See above.  All other pertinent systems reviewed and negative.    Physical Exam     Vitals:    07/16/23 1039   BP: (!) 152/78   Pulse: 76   Resp: 18   Temp: 98.2 °F (36.8 °C)   SpO2: 100%   Weight: 133.8 kg (295 lb)   Height: 5' 7\" (1.702 m)      Physical Exam    VS: Reviewed.   Const: NL appearance. Not ill or toxic-appearing. No distress or diaphoresis.   Eyes: EOMI. Conjunctivae NL w/o discharge.   HENT: TM NL bilat. MMM. No trismus/abscess. No OP exudate/erythema. Sinuses tender. Nose NL.   Resp: Unlabored, no distress. No tachypnea. No accessory muscle usage. Faint end expiratory wheeze in apices, otherwise    CV: RRR. NL S1S2. No S3, S4, murmur, rub or gallop.   Abd: BS NL. Abd ND, soft, NT, No HSM. No CVAT.    MS: Neck supple with NL ROM.   Lymph: No pre/postauricular, cervical, submandibular, submental or occipital adenopathy.   Skin: Skin warm and dry. No rash. NL color. Cap refill < 3 seconds.   Neuro: Alert, oriented x3, no gross sensory motor deficits.   Psych: Cooperative. NL mood and behavior.       MDM / Assessment / Plan     Clinical Impression  1. Rhinosinusitis    2. Wheezing      Will treat with below medications and advise fluids, rest, nasal saline, close follow up and ED precautions.      Encounter Orders  Orders Placed This Encounter   • Promethazine-DM 6.25-15 MG/5ML Solution   • doxycycline hyclate (VIBRA-TABS) 100 MG tablet   • albuterol 108 (90 Base) MCG/ACT inhaler     Information in after visit summary explained to patient. I counseled the patient on clinical reasoning, diagnosis, and treatment recommendations as well as follow up instructions and understanding was verbalized.   show

## 2023-11-21 NOTE — ED PROVIDER NOTE - NSFOLLOWUPINSTRUCTIONS_ED_ALL_ED_FT
n/a Cellulitis    Please take 1 tab clindamycin 300mg by mouth four times a day for 10 days.    Please return to the ER in 24 hours for re-evaluation as discussed.    Cellulitis is a skin infection caused by bacteria. This condition occurs most often in the arms and lower legs but can occur anywhere over the body. Symptoms include redness, swelling, warm skin, tenderness, and chills/fever. If you were prescribed an antibiotic medicine, take it as told by your health care provider. Do not stop taking the antibiotic even if you start to feel better.    SEEK IMMEDIATE MEDICAL CARE IF YOU HAVE ANY OF THE FOLLOWING SYMPTOMS: worsening fever, red streaks coming from affected area, vomiting or diarrhea, or dizziness/lightheadedness.

## 2024-01-29 NOTE — ED ADULT TRIAGE NOTE - SOURCE OF INFORMATION
[FreeTextEntry1] : 69yo M who presents with elevated PSA. Standard TRUS biopsy was negative (6/15/23). Pt has no family history, has not had any imaging (pacemaker).  Prostate Cancer (4+4=8) - 7.0 (9/21/23) < 4.20 (8/15/22) - Previous TRUS (Dr. Avila) biopsy (non-targeted) negative  - MRI (pace-maker protocol) read as negative; however, we added a small left mid pzpm lesion that was ultimately positive for GG4 CaP.  - Repeat MR-targeted biopsy showed Celine 4+4=8 (extremely small lesion 5mm). Discussed r/b/a of all treatment options including surgery, radiation, and off-trial focal therapy. Discussed that if patient is interested in focal therapy that the treatment is not standard-of-care; however, given the location and localized size of lesion, it is a reasonable option. He understands that he would be on surveillance protocol after focal therapy (including post-ablation MRI and confirmatory biopsy at one-year).  - PSMA negative  - The patient will discuss results and options with Dr. Avila - Will need SpaceOAR prior to cryoablation  Thank you very much for allowing me to assist in the care of this patient. Please do not hesitate to contact me with any additional questions or concerns.      Sincerely,     Wing Turk D.O. Professor of Urology and Radiology  of Urology at [facility] Director for Prostate Cancer [address] [place] [address], [address] [facility], [facility] Phone: [phone] Patient
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
## 2024-09-11 NOTE — ED ADULT NURSE NOTE - NS ED NOTE  TALK SOMEONE YN
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  Therefore lets refer him to neurosurgery for consultation  
No

## 2025-04-28 NOTE — ED ADULT NURSE NOTE - NSFALLRSKASSESASSIST_ED_ALL_ED
Do You Have A Family History Of Psoriasis?: no How Severe Is Your Psoriasis?: mild Is This A New Presentation, Or A Follow-Up?: Follow Up Psoriasis Additional History: Patients last injection was on January 6,2025 no